# Patient Record
Sex: MALE | Race: BLACK OR AFRICAN AMERICAN | NOT HISPANIC OR LATINO | URBAN - METROPOLITAN AREA
[De-identification: names, ages, dates, MRNs, and addresses within clinical notes are randomized per-mention and may not be internally consistent; named-entity substitution may affect disease eponyms.]

---

## 2022-08-09 ENCOUNTER — EMERGENCY (EMERGENCY)
Facility: HOSPITAL | Age: 62
LOS: 0 days | Discharge: HOME | End: 2022-08-09
Attending: STUDENT IN AN ORGANIZED HEALTH CARE EDUCATION/TRAINING PROGRAM

## 2022-08-09 VITALS
SYSTOLIC BLOOD PRESSURE: 182 MMHG | WEIGHT: 175.05 LBS | DIASTOLIC BLOOD PRESSURE: 111 MMHG | HEART RATE: 104 BPM | RESPIRATION RATE: 19 BRPM | TEMPERATURE: 98 F | HEIGHT: 74 IN | OXYGEN SATURATION: 97 %

## 2022-08-09 DIAGNOSIS — R33.9 RETENTION OF URINE, UNSPECIFIED: ICD-10-CM

## 2022-08-09 DIAGNOSIS — N40.0 BENIGN PROSTATIC HYPERPLASIA WITHOUT LOWER URINARY TRACT SYMPTOMS: ICD-10-CM

## 2022-08-09 LAB
APPEARANCE UR: ABNORMAL
BACTERIA # UR AUTO: ABNORMAL
BILIRUB UR-MCNC: NEGATIVE — SIGNIFICANT CHANGE UP
COLOR SPEC: ABNORMAL
DIFF PNL FLD: ABNORMAL
GLUCOSE UR QL: NEGATIVE MG/DL — SIGNIFICANT CHANGE UP
KETONES UR-MCNC: ABNORMAL
LEUKOCYTE ESTERASE UR-ACNC: ABNORMAL
NITRITE UR-MCNC: NEGATIVE — SIGNIFICANT CHANGE UP
PH UR: 7 — SIGNIFICANT CHANGE UP (ref 5–8)
PROT UR-MCNC: 500 MG/DL
RBC CASTS # UR COMP ASSIST: SIGNIFICANT CHANGE UP /HPF (ref 0–4)
SP GR SPEC: 1.01 — SIGNIFICANT CHANGE UP (ref 1.01–1.02)
UROBILINOGEN FLD QL: NEGATIVE MG/DL — SIGNIFICANT CHANGE UP
WBC UR QL: ABNORMAL

## 2022-08-09 PROCEDURE — 99284 EMERGENCY DEPT VISIT MOD MDM: CPT

## 2022-08-09 RX ORDER — CEFDINIR 250 MG/5ML
1 POWDER, FOR SUSPENSION ORAL
Qty: 20 | Refills: 0
Start: 2022-08-09 | End: 2022-08-18

## 2022-08-09 NOTE — ED PROVIDER NOTE - CLINICAL SUMMARY MEDICAL DECISION MAKING FREE TEXT BOX
Pt was clinically in urinary retention likely 2/2 BPH. Almodovar immediately palced with 300cc bloody urine, no clots, flowing well and pt now comfortalbe. will check UA to r/o infection and have pt followup w/ urology tomorrow at [facility]. will keep almodovar in place

## 2022-08-09 NOTE — ED ADULT TRIAGE NOTE - CHIEF COMPLAINT QUOTE
patient here for Urinary retention , last night urinating was last night , c/o of abdominal pain , patient had a cath remove yesterday

## 2022-08-09 NOTE — ED ADULT NURSE NOTE - NSIMPLEMENTINTERV_GEN_ALL_ED
Implemented All Universal Safety Interventions:  [place] to call system. Call bell, personal items and telephone within reach. Instruct patient to call for assistance. Room bathroom lighting operational. Non-slip footwear when patient is off stretcher. Physically safe environment: no spills, clutter or unnecessary equipment. Stretcher in lowest position, wheels locked, appropriate side rails in place.

## 2022-08-09 NOTE — ED PROVIDER NOTE - NS ED ROS FT
CONST: no fevers, no chills  EYES: no pain, no vision changes  ENT: no sore throat, no ear pain, no change in hearing  CV: no chest pain, no leg swelling  RESP: no shortness of breath, no cough  ABD: no abdominal pain, no nausea, no vomiting, no diarrhea  : no dysuria, no flank pain  MSK: no back pain, no extremity pain  NEURO: no headache or additional neurologic complaints  HEME: no easy bleeding  SKIN:  no rash

## 2022-08-09 NOTE — ED PROVIDER NOTE - PHYSICAL EXAMINATION
General: apppears uncomfortable  HEENT: NCAT, Neck supple without meningismus, PERRL, no conjunctival injection  Lungs: CTAB, No wheeze or crackles, No retractions, No increased work of breathing  Heart: S1S2 RRR, No M/R/G, Pules equal Bilaterally in upper and lower extremities distally  Abd: soft, suprapubic distention and tenderness, No guarding, No rebound.  No hernias, no palpable masses.  Extrem: FROM in all joints, no gross deformities appreciated, no significant edema noted, No ulcers. Cap refil < 2sec.  Skin: No rash noted, warm dry.  Neuro:  Grossly normal.  No difficulty ambulating. No focal deficits.  Psychiatric: Appropriate mood and affect.

## 2022-08-09 NOTE — ED PROVIDER NOTE - IV ALTEPLASE EXCL ABS HIDDEN
NEPHROLOGY PROGRESS NOTE    ADMISSION DATE:  6/30/2021  DATE:  7/4/2021  CURRENT HOSPITAL DAY:  Hospital Day: 5  ATTENDING PHYSICIAN:  Trace Lane MD      CHIEF COMPLAINT:  beth      INTERVAL HISTORY:    Patient underwent UF session yesterday with 3 L fluid removal.  Her breathing is better.  She denies any worsening shortness of breath or chest pain.  She is currently on a Protonix drip and hemoglobin is stable    REVIEW OF SYSTEMS:   A complete 3 point ROS done and is neg except as mentioned above        MEDICATIONS:    • furosemide  80 mg Intravenous BID   • iron sucrose (VENOFER) IVPB  200 mg Intravenous Daily   • sucralfate  1 g Oral 4x Daily AC & HS   • allopurinol  50 mg Oral Daily   • atorvastatin  40 mg Oral Nightly   • brimonidine  1 drop Both Eyes BID   • calcium carbonate-vitamin D  1 tablet Oral Daily with breakfast   • dorzolamide  1 drop Right Eye BID   • insulin glargine  8 Units Subcutaneous Nightly   • isosorbide mononitrate  30 mg Oral Daily   • metoPROLOL succinate  25 mg Oral Daily   • NIFEdipine XL  120 mg Oral Daily   • doxazosin  8 mg Oral Nightly   • timolol  1 drop Both Eyes BID   • nystatin   Topical 2 times per day   • sodium chloride (PF)  2 mL Intracatheter 2 times per day   • insulin lispro   Subcutaneous TID AC   • amoxicillin-clavulanate  1 tablet Oral Q24H         OBJECTIVE:    VITAL SIGNS:     Visit Vitals  /79 (BP Location: LUE - Left upper extremity, Patient Position: Semi-Damon's)   Pulse 67   Temp 98.1 °F (36.7 °C) (Oral)   Resp 20   Ht 5' 2\" (1.575 m)   Wt 71.3 kg   SpO2 98%   BMI 28.75 kg/m²         INTAKE/OUTPUT:      Intake/Output Summary (Last 24 hours) at 7/4/2021 1004  Last data filed at 7/4/2021 0820  Gross per 24 hour   Intake 905.35 ml   Output 3000 ml   Net -2094.65 ml         PHYSICAL EXAM:    GEN: No apparent distress, sitting in chair  HEENT: No conjunctival erythema, neck supple, normocephalic head, mucous membranes moist  SKIN: Warm to touch  EXT: 1+  pedal edema, RUE edema +  PSYCH: Cooperative  NEURO: Alert and oriented x 1-2      LABORATORY DATA:    Lab Results   Component Value Date    SODIUM 140 07/03/2021    SODIUM 137 07/02/2021    POTASSIUM 3.9 07/03/2021    POTASSIUM 5.1 07/02/2021    CHLORIDE 103 07/03/2021    CHLORIDE 102 07/02/2021    CO2 31 07/03/2021    CO2 28 07/02/2021    BUN 34 (H) 07/03/2021    BUN 61 (H) 07/02/2021    CREATININE 3.08 (H) 07/03/2021    CREATININE 4.19 (H) 07/02/2021    GLUCOSE 75 07/03/2021    GLUCOSE 122 (H) 07/02/2021     Lab Results   Component Value Date    WBC 11.9 (H) 07/03/2021    WBC 11.2 (H) 07/02/2021    HCT 29.1 (L) 07/03/2021    HCT 26.3 (L) 07/02/2021    HGB 9.1 (L) 07/03/2021    HGB 8.3 (L) 07/02/2021    PLT 88 (L) 07/03/2021     07/02/2021     No components found for: CRCLCALC  Lab Results   Component Value Date    INR 1.1 06/30/2021    INR 1.1 06/21/2021                                    ASSESSMENT/PLAN:     ESRD on MWF dialysis at [facility]  Acute upper GI bleed due to duodenal ulcer  Peptic ulcer disease-due to duodenal ulcer  Anemia of chronic disease and blood loss  Fluid overload  Acute on chronic right heart failure exacerbation  Severe pulmonary hypertension  Hypertension  Severe protein calorie malnutrition    Plan  -Plan for dialysis tomorrow as per her MWF schedule  -Her edema slowly improving.  Monitor  -Hemoglobin stable.  On Protonix drip  -BP stable.  Continue with current management  -Continue with Lasix 80 mg IV b.i.d.  -Potassium better.  Should correct further with dialysis tomorrow  -Overall patient doing better      Discussed with RN in detail    Thank you for providing me with the opportunity to care for this patient. Will continue to follow closely. Please feel free to page me with any questions at [phone].                    show

## 2022-08-09 NOTE — ED ADULT NURSE NOTE - OBJECTIVE STATEMENT
as per patient c/o urinary retention since last night, had almodovar removed yesterday. pt is axo4, respirations spontaneous and unlabored.   Hx: BPH

## 2022-08-09 NOTE — ED PROVIDER NOTE - PATIENT PORTAL LINK FT
You can access the FollowMyHealth Patient Portal offered by [facility] by registering at the following website: [email or web address]. By joining [employer]’s FollowMyHealth portal, you will also be able to view your health information using other applications (apps) compatible with our system.

## 2022-08-09 NOTE — ED ADULT TRIAGE NOTE - PATIENT ON (OXYGEN DELIVERY METHOD)
Start taking Eliquis twice daily   Start taking Metoprolol once daily   STOP taking your baby aspirin    room air

## 2022-08-09 NOTE — ED PROVIDER NOTE - OBJECTIVE STATEMENT
pmh of BPH p/w urinary retention x 6 hrs. Pt states he had almodovar removed by urolgoist at [facility] yesterday. States he passed TOV but since last night has been unable to void. has had mild dribbling. denies flank pain fever chills. States sxs consitent w prior urinary retention.

## 2022-08-09 NOTE — ED PROVIDER NOTE - NSFOLLOWUPINSTRUCTIONS_ED_ALL_ED_FT
Urinary Tract Infection    A urinary tract infection (UTI) is an infection of any part of the urinary tract, which includes the kidneys, ureters, bladder, and urethra. Risk factors include ignoring your need to urinate, wiping back to front if female, being an uncircumcised male, and having diabetes or a weak immune system. Symptoms include frequent urination, pain or burning with urination, foul smelling urine, cloudy urine, pain in the lower abdomen, blood in the urine, and fever. If you were prescribed an antibiotic medicine, take it as told by your health care provider. Do not stop taking the antibiotic even if you start to feel better.    SEEK IMMEDIATE MEDICAL CARE IF YOU HAVE ANY OF THE FOLLOWING SYMPTOMS: severe back or abdominal pain, fever, inability to keep fluids or medicine down, dizziness/lightheadedness, or a change in mental status.     López Catheter Placement and Care    WHAT YOU NEED TO KNOW:    A López catheter is a sterile tube that is inserted into your bladder to drain urine. It is also called an indwelling urinary catheter. The tip of the catheter has a small balloon filled with solution that holds the catheter inside your bladder.              DISCHARGE INSTRUCTIONS:    Seek care immediately if:     Your catheter comes out.       You suddenly have material that looks like sand in the tubing or drainage bag.      No urine is draining into the bag and you have checked the system.      You have pain in your hip, back, pelvis, or lower abdomen.      You are confused or cannot think clearly.    Call your doctor or urologist if:     You have a fever.      You have bladder spasms for more than 1 day after the catheter is placed.      You see blood in the tubing or drainage bag.      You have a rash or itching where the catheter tube is secured to your skin.      Urine leaks from or around the catheter, tubing, or drainage bag.      The closed drainage system has accidently come open or apart.       You see a layer of crystals inside the tubing.      You have questions or concerns about your condition or care.    Care for your López catheter:     Clean your genital area 2 times every day. Clean your catheter and the area around where it was inserted. Use soap and water. Clean your anal opening and catheter area after every bowel movement.       Secure the catheter tube so you do not pull or move the catheter. This helps prevent pain and bladder spasms. Healthcare providers will show you how to use medical tape or a strap to secure the catheter tube to your body.       Keep a closed drainage system. Your López catheter should always be attached to the drainage bag to form a closed system. Do not disconnect any part of the closed system unless you need to change the bag.    Care for your drainage bag:     Ask if a leg bag is right for you. A leg bag can be worn under your clothes. Ask your healthcare provider for more information about a leg bag.       Keep the drainage bag below the level of your waist. This helps stop urine from moving back up the tubing and into your bladder. Do not loop or kink the tubing. This can cause urine to back up and collect in your bladder. Do not let the drainage bag touch or lie on the floor.      Empty the drainage bag when needed. The weight of a full drainage bag can be painful. Empty the drainage bag every 3 to 6 hours or when it is ? full.       Clean and change the drainage bag as directed. Ask your healthcare provider how often you should change the drainage bag and what cleaning solution to use. Wear disposable gloves when you change the bag. Do not allow the end of the catheter or tubing to touch anything. Clean the ends with an alcohol pad before you reconnect them.    What to do if problems develop:     No urine is draining into the bag:   Check for kinks in the tubing and straighten them out.       Check the tape or strap used to secure the catheter tube to your skin. Make sure it is not blocking the tube.       Make sure you are not sitting or lying on the tubing.      Make sure the urine bag is hanging below the level of your waist.      Urine leaks from or around the catheter, tubing, or drainage bag: Check if the closed drainage system has accidently come open or apart. Clean the catheter and tubing ends with a new alcohol pad and reconnect them.     Prevent an infection:     Wash your hands often. Wash before and after you touch your catheter, tubing, or drainage bag. Use soap and water. Wear clean disposable gloves when you care for your catheter or disconnect the drainage bag. Wash your hands before you prepare or eat food. Handwashing           Drink liquids as directed. Ask your healthcare provider how much liquid to drink each day and which liquids are best for you. Liquids will help flush your kidneys and bladder to help prevent infection.    Follow up with your doctor or urologist as directed: Write down your questions so you remember to ask them during your visits.

## 2022-08-10 ENCOUNTER — EMERGENCY (EMERGENCY)
Facility: HOSPITAL | Age: 62
LOS: 0 days | Discharge: HOME | End: 2022-08-10
Attending: STUDENT IN AN ORGANIZED HEALTH CARE EDUCATION/TRAINING PROGRAM

## 2022-08-10 VITALS
SYSTOLIC BLOOD PRESSURE: 128 MMHG | RESPIRATION RATE: 18 BRPM | OXYGEN SATURATION: 98 % | TEMPERATURE: 98 F | DIASTOLIC BLOOD PRESSURE: 78 MMHG | HEART RATE: 78 BPM

## 2022-08-10 VITALS
HEART RATE: 75 BPM | HEIGHT: 74 IN | DIASTOLIC BLOOD PRESSURE: 83 MMHG | TEMPERATURE: 98 F | OXYGEN SATURATION: 96 % | RESPIRATION RATE: 16 BRPM | SYSTOLIC BLOOD PRESSURE: 134 MMHG | WEIGHT: 175.05 LBS
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
## 2022-08-10 DIAGNOSIS — R33.9 RETENTION OF URINE, UNSPECIFIED: ICD-10-CM
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
## 2022-08-10 DIAGNOSIS — T83.031A LEAKAGE OF INDWELLING URETHRAL CATHETER, INITIAL ENCOUNTER: ICD-10-CM

## 2022-08-10 DIAGNOSIS — N40.0 BENIGN PROSTATIC HYPERPLASIA WITHOUT LOWER URINARY TRACT SYMPTOMS: ICD-10-CM

## 2022-08-10 PROCEDURE — 99282 EMERGENCY DEPT VISIT SF MDM: CPT

## 2022-08-10 NOTE — ED PROVIDER NOTE - CLINICAL SUMMARY MEDICAL DECISION MAKING FREE TEXT BOX
well appearing, almodovar catheter flushing and filling leg bag without issue in ED. has urology follow up. will dc

## 2022-08-10 NOTE — ED ADULT NURSE NOTE - OBJECTIVE STATEMENT
Received pt in the ED, AOx3, from triage. C/o López cathter complications. Pt endorsed the López was clotted and it was leaking, but it resolved in the ED and the urine is draining to the bag. Urine noted red color, pt endorsed it is same as the López was placed yesterday in this ED. Pt denied cp, sob, n/v/d, no abd pain. Speaks full sentences, unlabored breathing on RA. Able to LÓPEZ.

## 2022-08-10 NOTE — ED PROVIDER NOTE - OBJECTIVE STATEMENT
62m hx bph. seen in ED yesterday for urinary retention. d/w almodovar and empiric antibiotic coverage returning today for urine leaking around the almodovar cather and out his penis. this was happening when the almodovar bag was full. no that the almodovar bag is empty he is passing urine into the bag without issuer

## 2022-08-10 NOTE — ED ADULT TRIAGE NOTE - CHIEF COMPLAINT QUOTE
hx of BPH PT reports leaking urinary catheter. last noted urine 2200. López placed yesterday in this ED.

## 2022-08-10 NOTE — ED PROVIDER NOTE - PHYSICAL EXAMINATION
General: Awake, alert and oriented. No acute distress. Well developed, hydrated and nourished. Appears stated age.   Skin: Skin in warm, dry and intact without rashes or lesions. Appropriate color for ethnicity  HENMT: head normocephalic and atraumatic; bilateral external ears without swelling. no nasal discharge. moist oral mucosa. supple neck, trachea midline  EYES: Conjunctiva clear. nonicteric sclera. EOM intact, Eyelids are normal in appearance without swelling or lesions.  Cardiac: well perfused  Respiratory: breathing comfortably on room air. no audible wheezing or stridor  Abdominal: nondistended, soft, nontender  : almodovar cather in place draining blood tinged urine  MSK: Neck and back are without deformity, visible external skin changes, or signs of trauma. Curvature of the cervical, thoracic, and lumbar spine are within normal limits. no external signs of trauma. no apparent deficits in ROM of any extremity  Neurological: The patient is awake, alert and oriented to person, place, and time with normal speech. CN 2-12 grossly intact. no apparent deficits. Memory is normal and thought process is intact. No gait abnormalities are appreciated.   Psychiatric: Appropriate mood and affect. Good judgement and insight. No visual or auditory hallucinations.

## 2022-08-10 NOTE — ED ADULT NURSE NOTE - NSIMPLEMENTINTERV_GEN_ALL_ED
Implemented All Universal Safety Interventions:  [place] to call system. Call bell, personal items and telephone within reach. Instruct patient to call for assistance. Room bathroom lighting operational. Non-slip footwear when patient is off stretcher. Physically safe environment: no spills, clutter or unnecessary equipment. Stretcher in lowest position, wheels locked, appropriate side rails in place.

## 2022-08-10 NOTE — ED PROVIDER NOTE - PATIENT PORTAL LINK FT
You can access the FollowMyHealth Patient Portal offered by [facility] by registering at the following website: [email or web address]. By joining [employer]’s FollowMyHealth portal, you will also be able to view your health information using other applications (apps) compatible with our system.

## 2022-08-11 LAB
-  AMIKACIN: SIGNIFICANT CHANGE UP
-  AMOXICILLIN/CLAVULANIC ACID: SIGNIFICANT CHANGE UP
-  AMPICILLIN/SULBACTAM: SIGNIFICANT CHANGE UP
-  AMPICILLIN: SIGNIFICANT CHANGE UP
-  AZTREONAM: SIGNIFICANT CHANGE UP
-  CEFAZOLIN: SIGNIFICANT CHANGE UP
-  CEFEPIME: SIGNIFICANT CHANGE UP
-  CEFTRIAXONE: SIGNIFICANT CHANGE UP
-  CIPROFLOXACIN: SIGNIFICANT CHANGE UP
-  ERTAPENEM: SIGNIFICANT CHANGE UP
-  GENTAMICIN: SIGNIFICANT CHANGE UP
-  IMIPENEM: SIGNIFICANT CHANGE UP
-  LEVOFLOXACIN: SIGNIFICANT CHANGE UP
-  MEROPENEM: SIGNIFICANT CHANGE UP
-  NITROFURANTOIN: SIGNIFICANT CHANGE UP
-  PIPERACILLIN/TAZOBACTAM: SIGNIFICANT CHANGE UP
-  TIGECYCLINE: SIGNIFICANT CHANGE UP
-  TOBRAMYCIN: SIGNIFICANT CHANGE UP
-  TRIMETHOPRIM/SULFAMETHOXAZOLE: SIGNIFICANT CHANGE UP
CULTURE RESULTS: SIGNIFICANT CHANGE UP
METHOD TYPE: SIGNIFICANT CHANGE UP
ORGANISM # SPEC MICROSCOPIC CNT: SIGNIFICANT CHANGE UP
ORGANISM # SPEC MICROSCOPIC CNT: SIGNIFICANT CHANGE UP
SPECIMEN SOURCE: SIGNIFICANT CHANGE UP

## 2022-08-14 ENCOUNTER — INPATIENT (INPATIENT)
Facility: HOSPITAL | Age: 62
LOS: 9 days | Discharge: HOME HEALTH | End: 2022-08-24
Attending: INTERNAL MEDICINE | Admitting: INTERNAL MEDICINE

## 2022-08-14 VITALS
DIASTOLIC BLOOD PRESSURE: 73 MMHG | WEIGHT: 175.05 LBS | TEMPERATURE: 98 F | OXYGEN SATURATION: 97 % | HEIGHT: 74 IN | SYSTOLIC BLOOD PRESSURE: 125 MMHG | HEART RATE: 69 BPM | RESPIRATION RATE: 15 BRPM

## 2022-08-14 DIAGNOSIS — N39.0 URINARY TRACT INFECTION, SITE NOT SPECIFIED: ICD-10-CM

## 2022-08-14 DIAGNOSIS — Z87.898 PERSONAL HISTORY OF OTHER SPECIFIED CONDITIONS: ICD-10-CM

## 2022-08-14 LAB
ALBUMIN SERPL ELPH-MCNC: 3.7 G/DL — SIGNIFICANT CHANGE UP (ref 3.3–5)
ALP SERPL-CCNC: 73 U/L — SIGNIFICANT CHANGE UP (ref 40–120)
ALT FLD-CCNC: 40 U/L — SIGNIFICANT CHANGE UP (ref 12–78)
ANION GAP SERPL CALC-SCNC: 4 MMOL/L — LOW (ref 5–17)
APPEARANCE UR: ABNORMAL
APTT BLD: 31.5 SEC — SIGNIFICANT CHANGE UP (ref 27.5–35.5)
AST SERPL-CCNC: 24 U/L — SIGNIFICANT CHANGE UP (ref 15–37)
BACTERIA # UR AUTO: ABNORMAL
BASOPHILS # BLD AUTO: 0.06 K/UL — SIGNIFICANT CHANGE UP (ref 0–0.2)
BASOPHILS NFR BLD AUTO: 1 % — SIGNIFICANT CHANGE UP (ref 0–2)
BILIRUB SERPL-MCNC: 0.3 MG/DL — SIGNIFICANT CHANGE UP (ref 0.2–1.2)
BILIRUB UR-MCNC: NEGATIVE — SIGNIFICANT CHANGE UP
BUN SERPL-MCNC: 14 MG/DL — SIGNIFICANT CHANGE UP (ref 7–23)
CALCIUM SERPL-MCNC: 9.2 MG/DL — SIGNIFICANT CHANGE UP (ref 8.5–10.1)
CHLORIDE SERPL-SCNC: 107 MMOL/L — SIGNIFICANT CHANGE UP (ref 96–108)
CO2 SERPL-SCNC: 30 MMOL/L — SIGNIFICANT CHANGE UP (ref 22–31)
COLOR SPEC: ABNORMAL
CREAT SERPL-MCNC: 1.07 MG/DL — SIGNIFICANT CHANGE UP (ref 0.5–1.3)
DIFF PNL FLD: ABNORMAL
EGFR: 78 ML/MIN/1.73M2 — SIGNIFICANT CHANGE UP
EOSINOPHIL # BLD AUTO: 0.27 K/UL — SIGNIFICANT CHANGE UP (ref 0–0.5)
EOSINOPHIL NFR BLD AUTO: 4.4 % — SIGNIFICANT CHANGE UP (ref 0–6)
FLUAV AG NPH QL: SIGNIFICANT CHANGE UP
FLUBV AG NPH QL: SIGNIFICANT CHANGE UP
GLUCOSE SERPL-MCNC: 94 MG/DL — SIGNIFICANT CHANGE UP (ref 70–99)
GLUCOSE UR QL: NEGATIVE MG/DL — SIGNIFICANT CHANGE UP
HCT VFR BLD CALC: 40.5 % — SIGNIFICANT CHANGE UP (ref 39–50)
HGB BLD-MCNC: 12.6 G/DL — LOW (ref 13–17)
IMM GRANULOCYTES NFR BLD AUTO: 0.3 % — SIGNIFICANT CHANGE UP (ref 0–1.5)
INR BLD: 0.98 RATIO — SIGNIFICANT CHANGE UP (ref 0.88–1.16)
KETONES UR-MCNC: NEGATIVE — SIGNIFICANT CHANGE UP
LACTATE SERPL-SCNC: 1.2 MMOL/L — SIGNIFICANT CHANGE UP (ref 0.7–2)
LEUKOCYTE ESTERASE UR-ACNC: ABNORMAL
LYMPHOCYTES # BLD AUTO: 1.81 K/UL — SIGNIFICANT CHANGE UP (ref 1–3.3)
LYMPHOCYTES # BLD AUTO: 29.4 % — SIGNIFICANT CHANGE UP (ref 13–44)
MCHC RBC-ENTMCNC: 26.3 PG — LOW (ref 27–34)
MCHC RBC-ENTMCNC: 31.1 G/DL — LOW (ref 32–36)
MCV RBC AUTO: 84.6 FL — SIGNIFICANT CHANGE UP (ref 80–100)
MONOCYTES # BLD AUTO: 0.54 K/UL — SIGNIFICANT CHANGE UP (ref 0–0.9)
MONOCYTES NFR BLD AUTO: 8.8 % — SIGNIFICANT CHANGE UP (ref 2–14)
NEUTROPHILS # BLD AUTO: 3.46 K/UL — SIGNIFICANT CHANGE UP (ref 1.8–7.4)
NEUTROPHILS NFR BLD AUTO: 56.1 % — SIGNIFICANT CHANGE UP (ref 43–77)
NITRITE UR-MCNC: POSITIVE
NRBC # BLD: 0 /100 WBCS — SIGNIFICANT CHANGE UP (ref 0–0)
PH UR: 7 — SIGNIFICANT CHANGE UP (ref 5–8)
PLATELET # BLD AUTO: 295 K/UL — SIGNIFICANT CHANGE UP (ref 150–400)
POTASSIUM SERPL-MCNC: 4.2 MMOL/L — SIGNIFICANT CHANGE UP (ref 3.5–5.3)
POTASSIUM SERPL-SCNC: 4.2 MMOL/L — SIGNIFICANT CHANGE UP (ref 3.5–5.3)
PROT SERPL-MCNC: 7.1 GM/DL — SIGNIFICANT CHANGE UP (ref 6–8.3)
PROT UR-MCNC: 500 MG/DL
PROTHROM AB SERPL-ACNC: 11.7 SEC — SIGNIFICANT CHANGE UP (ref 10.5–13.4)
RBC # BLD: 4.79 M/UL — SIGNIFICANT CHANGE UP (ref 4.2–5.8)
RBC # FLD: 14.8 % — HIGH (ref 10.3–14.5)
RBC CASTS # UR COMP ASSIST: >50 /HPF (ref 0–4)
SARS-COV-2 RNA SPEC QL NAA+PROBE: SIGNIFICANT CHANGE UP
SODIUM SERPL-SCNC: 141 MMOL/L — SIGNIFICANT CHANGE UP (ref 135–145)
SP GR SPEC: 1.01 — SIGNIFICANT CHANGE UP (ref 1.01–1.02)
UROBILINOGEN FLD QL: NEGATIVE MG/DL — SIGNIFICANT CHANGE UP
WBC # BLD: 6.16 K/UL — SIGNIFICANT CHANGE UP (ref 3.8–10.5)
WBC # FLD AUTO: 6.16 K/UL — SIGNIFICANT CHANGE UP (ref 3.8–10.5)
WBC UR QL: SIGNIFICANT CHANGE UP

## 2022-08-14 PROCEDURE — 99285 EMERGENCY DEPT VISIT HI MDM: CPT

## 2022-08-14 RX ORDER — TAMSULOSIN HYDROCHLORIDE 0.4 MG/1
0.4 CAPSULE ORAL AT BEDTIME
Refills: 0 | Status: DISCONTINUED | OUTPATIENT
Start: 2022-08-14 | End: 2022-08-24

## 2022-08-14 RX ORDER — SODIUM CHLORIDE 9 MG/ML
1000 INJECTION INTRAMUSCULAR; INTRAVENOUS; SUBCUTANEOUS
Refills: 0 | Status: DISCONTINUED | OUTPATIENT
Start: 2022-08-14 | End: 2022-08-24

## 2022-08-14 RX ORDER — LIDOCAINE HCL 20 MG/ML
10 VIAL (ML) INJECTION ONCE
Refills: 0 | Status: COMPLETED | OUTPATIENT
Start: 2022-08-14 | End: 2022-08-14

## 2022-08-14 RX ORDER — PIPERACILLIN AND TAZOBACTAM 4; .5 G/20ML; G/20ML
3.38 INJECTION, POWDER, LYOPHILIZED, FOR SOLUTION INTRAVENOUS ONCE
Refills: 0 | Status: COMPLETED | OUTPATIENT
Start: 2022-08-14 | End: 2022-08-14

## 2022-08-14 RX ADMIN — PIPERACILLIN AND TAZOBACTAM 200 GRAM(S): 4; .5 INJECTION, POWDER, LYOPHILIZED, FOR SOLUTION INTRAVENOUS at 20:08

## 2022-08-14 RX ADMIN — SODIUM CHLORIDE 75 MILLILITER(S): 9 INJECTION INTRAMUSCULAR; INTRAVENOUS; SUBCUTANEOUS at 20:08

## 2022-08-14 RX ADMIN — TAMSULOSIN HYDROCHLORIDE 0.4 MILLIGRAM(S): 0.4 CAPSULE ORAL at 21:26

## 2022-08-14 NOTE — H&P ADULT - NSHPPHYSICALEXAM_GEN_ALL_CORE
PHYSICAL EXAM:    GENERAL: NAD, well-groomed, well-developed  HEAD:  Atraumatic, Normocephalic  EYES: EOMI, PERRLA, conjunctiva and sclera clear  ENMT: No tonsillar erythema, exudates, or enlargement; Moist mucous membranes, , No lesions  NECK: Supple, No JVD, Normal thyroid  NERVOUS SYSTEM:  Alert & Oriented X4, ; Motor Strength 5/5 B/L upper and lower extremities; DTRs 2+ intact and symmetric  CHEST/LUNG: Clear  bilaterally; No rales, rhonchi, wheezing, or rubs  HEART: Regular rate and rhythm; No murmurs, rubs, or gallops  ABDOMEN: Soft, Nontender, Nondistended; no  masses Bowel sounds present  EXTREMITIES:  + Peripheral Pulses, No clubbing, cyanosis, or edema  LYMPH: No lymphadenopathy noted   RECTAL: deferred    SKIN: No rashes or lesions   almodovar  with  gross  hematuria

## 2022-08-14 NOTE — ED PROVIDER NOTE - PHYSICAL EXAMINATION
GEN: Awake, alert, interactive, NAD.  HEAD AND NECK: NC/AT. Airway patent. Neck supple.   EYES:  Clear b/l. EOMI. PERRL.   ENT: Moist mucus membranes.   CARDIAC: Regular rate, regular rhythm. No evident pedal edema.    RESP/CHEST: Normal respiratory effort with no use of accessory muscles or retractions. Clear throughout on auscultation.  ABD: soft, non-distended, non-tender. No rebound, no guarding.   BACK: No midline spinal TTP. No CVAT.   EXTREMITIES: Moving all extremities with no apparent deformities.   SKIN: Warm, dry, intact normal color. No rash.   NEURO: AOx3, CN II-XII grossly intact, no focal deficits.   PSYCH: Appropriate mood and affect.   : gross hematuria within López bag.

## 2022-08-14 NOTE — ED ADULT NURSE NOTE - CAS DISCH ACCOMP BY
How Severe Are Your Spot(S)?: mild Have Your Spot(S) Been Treated In The Past?: has not been treated Hpi Title: Evaluation of Skin Lesions Transport

## 2022-08-14 NOTE — ED PROVIDER NOTE - OBJECTIVE STATEMENT
63 y/o M wPMH of BPH presents to the ED for hematuria and UTI. Pt was called back to the ED b/c of (+) UA/C which showed resistance to antibiotics. Pt noted López has been in place since August 2nd.  Pt noted he has hx of UTI's in the past. Pt also noted blood in his López. Pt has no other complaints in the ED. 62M w/ PMH BPH, indwelling López since 8/2 called back to ED d/t (+) UC which showed resistance to prescribed PO abx. Pt reports López placed 8/2, d/c'd on 8/8 w/in Uro office, pt w/ urinary retention s/p removal,   63 y/o M wPMH of BPH presents to the ED for hematuria and UTI. Pt was called back to the ED b/c of (+) UA/C which showed resistance to antibiotics. Pt noted López has been in place since August 2nd.  Pt noted he has hx of UTI's in the past. Pt also noted blood in his López. Pt has no other complaints in the ED. 62M w/ PMH BPH, indwelling López since 8/2 called back to ED d/t (+) UC which showed resistance to prescribed PO abx. Pt reports López placed 8/2, d/c'd on 8/8 w/in Uro office, pt w/ urinary retention s/p removal, presented to ED on 8/9 w/ López placement. Pt returned to ED next day d/t leaking catheter. UC from 8/9-10 + ESBL Klebsiella, resistant to PO abx. Pt endorses + mild chill, intermittent gross hematuria and slight penile discomfort 2/2 López. Denies fever, h/a, dizziness, CP, SOB, cough, abd pain, flank pain, LE pain / swelling, rash. Pt w/ hx UTIs in past.     PMH BPH, PSH prostate, NKDA, meds Flomax.

## 2022-08-14 NOTE — H&P ADULT - ASSESSMENT
IMPROVE VTE Individual Risk Assessment    RISK                                                                Points    [  ] Previous VTE                                                  3    [  ] Thrombophilia                                               2    [  ] Lower limb paralysis                                      2        (unable to hold up >15 seconds)      [  ] Current Cancer                                              2         (within 6 months)    [ x ] Immobilization > 24 hrs                                1    [  ] ICU/CCU stay > 24 hours                              1    [ x ] Age > 60                                                      1    IMPROVE VTE Score ____2_____    IMPROVE Score 0-1: Low Risk, No VTE prophylaxis required for most patients, encourage ambulation.   IMPROVE Score 2-3: At risk, pharmacologic VTE prophylaxis is indicated for most patients (in the absence of a contraindication)  IMPROVE Score > or = 4: High Risk, pharmacologic VTE prophylaxis is indicated for most patients (in the absence of a contraindication)

## 2022-08-14 NOTE — ED PROVIDER NOTE - CLINICAL SUMMARY MEDICAL DECISION MAKING FREE TEXT BOX
63 yo m wP of BPH indwelling López and cath since Aug x2, called back to ED for (+) urine culture resistant to PO antibiotics, AFVSS, NAD, plan- repeat UA/C, blood cultures, lactate, CBC, CMP, discuss with urology and re-eval. 62M w/ PMH BPH, indwelling López since 8/2 called back to ED d/t (+) UC resistant to PO abx. AF, VSS. Well appearing, in NAD. Plan: CBC, CMP, lactate, BCs, replace López, UA/C. Consult Uro. 62M w/ PMH BPH, indwelling López since 8/2 called back to ED d/t (+) UC resistant to PO abx. AF, VSS. Well appearing, in NAD. Plan: CBC, CMP, lactate, BCs, replace López, UA/C. Consult Uro. Re-eval.

## 2022-08-14 NOTE — ED ADULT TRIAGE NOTE - CHIEF COMPLAINT QUOTE
Patient called to return to ed for abnormal urine test result as his cefdinir 300mgs bid is not effective against bacteria causing his infection. Has a López catheter in place since Tuesday.

## 2022-08-14 NOTE — H&P ADULT - NSHPLABSRESULTS_GEN_ALL_CORE
LABS:                        12.6   6.16  )-----------( 295      ( 14 Aug 2022 15:52 )             40.5     08-14    141  |  107  |  14  ----------------------------<  94  4.2   |  30  |  1.07    Ca    9.2      14 Aug 2022 15:52    TPro  7.1  /  Alb  3.7  /  TBili  0.3  /  DBili  x   /  AST  24  /  ALT  40  /  AlkPhos  73  08-14    PT/INR - ( 14 Aug 2022 15:52 )   PT: 11.7 sec;   INR: 0.98 ratio         PTT - ( 14 Aug 2022 15:52 )  PTT:31.5 sec  Urinalysis Basic - ( 14 Aug 2022 16:55 )    Color: Red / Appearance: bloody / S.010 / pH: x  Gluc: x / Ketone: Negative  / Bili: Negative / Urobili: Negative mg/dL   Blood: x / Protein: 500 mg/dL / Nitrite: Positive   Leuk Esterase: Trace / RBC: x / WBC x   Sq Epi: x / Non Sq Epi: x / Bacteria: x

## 2022-08-14 NOTE — ED PROVIDER NOTE - PROGRESS NOTE DETAILS
WBC, lactate WNL. UA w/ + infection (nitrite positive). D/w Uro/Surg PA, will admit to medicine for IV abx. Pt updated to results, admission. He understands / agrees w/ this plan.

## 2022-08-14 NOTE — H&P ADULT - HISTORY OF PRESENT ILLNESS
62M w/ PMH BPH, indwelling López since 8/2 called back to ED d/t (+) UC which showed resistance to prescribed PO abx. Pt reports López placed 8/2, d/c'd on 8/8 w/in Uro office, pt w/ urinary retention s/p removal, presented to ED on 8/9 w/ López placement. Pt returned to ED next day d/t leaking catheter. UC from 8/9-10 + ESBL Klebsiella, resistant to PO abx. Pt endorses + mild chill, intermittent gross hematuria and slight penile discomfort 2/2 López. Denies fever, h/a, dizziness, CP, SOB, cough, abd pain, flank pain, LE pain / swelling, rash. Pt w/ hx UTIs in past.   no  abd  pain  no  fever  no  chills

## 2022-08-15 LAB
ALBUMIN SERPL ELPH-MCNC: 3.2 G/DL — LOW (ref 3.3–5)
ALP SERPL-CCNC: 62 U/L — SIGNIFICANT CHANGE UP (ref 40–120)
ALT FLD-CCNC: 33 U/L — SIGNIFICANT CHANGE UP (ref 12–78)
ANION GAP SERPL CALC-SCNC: 4 MMOL/L — LOW (ref 5–17)
AST SERPL-CCNC: 19 U/L — SIGNIFICANT CHANGE UP (ref 15–37)
BILIRUB SERPL-MCNC: 0.3 MG/DL — SIGNIFICANT CHANGE UP (ref 0.2–1.2)
BUN SERPL-MCNC: 10 MG/DL — SIGNIFICANT CHANGE UP (ref 7–23)
CALCIUM SERPL-MCNC: 8.6 MG/DL — SIGNIFICANT CHANGE UP (ref 8.5–10.1)
CHLORIDE SERPL-SCNC: 110 MMOL/L — HIGH (ref 96–108)
CO2 SERPL-SCNC: 28 MMOL/L — SIGNIFICANT CHANGE UP (ref 22–31)
CREAT SERPL-MCNC: 1.06 MG/DL — SIGNIFICANT CHANGE UP (ref 0.5–1.3)
EGFR: 79 ML/MIN/1.73M2 — SIGNIFICANT CHANGE UP
GLUCOSE SERPL-MCNC: 93 MG/DL — SIGNIFICANT CHANGE UP (ref 70–99)
HCT VFR BLD CALC: 40.1 % — SIGNIFICANT CHANGE UP (ref 39–50)
HCV AB S/CO SERPL IA: 0.09 S/CO — SIGNIFICANT CHANGE UP (ref 0–0.99)
HCV AB SERPL-IMP: SIGNIFICANT CHANGE UP
HGB BLD-MCNC: 12.2 G/DL — LOW (ref 13–17)
HIV 1+2 AB+HIV1 P24 AG SERPL QL IA: SIGNIFICANT CHANGE UP
MCHC RBC-ENTMCNC: 25.8 PG — LOW (ref 27–34)
MCHC RBC-ENTMCNC: 30.4 G/DL — LOW (ref 32–36)
MCV RBC AUTO: 84.8 FL — SIGNIFICANT CHANGE UP (ref 80–100)
NRBC # BLD: 0 /100 WBCS — SIGNIFICANT CHANGE UP (ref 0–0)
PLATELET # BLD AUTO: 285 K/UL — SIGNIFICANT CHANGE UP (ref 150–400)
POTASSIUM SERPL-MCNC: 4.3 MMOL/L — SIGNIFICANT CHANGE UP (ref 3.5–5.3)
POTASSIUM SERPL-SCNC: 4.3 MMOL/L — SIGNIFICANT CHANGE UP (ref 3.5–5.3)
PROT SERPL-MCNC: 6.3 GM/DL — SIGNIFICANT CHANGE UP (ref 6–8.3)
RBC # BLD: 4.73 M/UL — SIGNIFICANT CHANGE UP (ref 4.2–5.8)
RBC # FLD: 14.9 % — HIGH (ref 10.3–14.5)
SODIUM SERPL-SCNC: 142 MMOL/L — SIGNIFICANT CHANGE UP (ref 135–145)
WBC # BLD: 6.3 K/UL — SIGNIFICANT CHANGE UP (ref 3.8–10.5)
WBC # FLD AUTO: 6.3 K/UL — SIGNIFICANT CHANGE UP (ref 3.8–10.5)

## 2022-08-15 RX ORDER — PIPERACILLIN AND TAZOBACTAM 4; .5 G/20ML; G/20ML
3.38 INJECTION, POWDER, LYOPHILIZED, FOR SOLUTION INTRAVENOUS ONCE
Refills: 0 | Status: COMPLETED | OUTPATIENT
Start: 2022-08-15 | End: 2022-08-15

## 2022-08-15 RX ORDER — LACTOBACILLUS ACIDOPHILUS 100MM CELL
1 CAPSULE ORAL
Refills: 0 | Status: DISCONTINUED | OUTPATIENT
Start: 2022-08-15 | End: 2022-08-24

## 2022-08-15 RX ORDER — PIPERACILLIN AND TAZOBACTAM 4; .5 G/20ML; G/20ML
INJECTION, POWDER, LYOPHILIZED, FOR SOLUTION INTRAVENOUS
Refills: 0 | Status: DISCONTINUED | OUTPATIENT
Start: 2022-08-15 | End: 2022-08-15

## 2022-08-15 RX ORDER — FINASTERIDE 5 MG/1
5 TABLET, FILM COATED ORAL DAILY
Refills: 0 | Status: DISCONTINUED | OUTPATIENT
Start: 2022-08-15 | End: 2022-08-24

## 2022-08-15 RX ORDER — PIPERACILLIN AND TAZOBACTAM 4; .5 G/20ML; G/20ML
3.38 INJECTION, POWDER, LYOPHILIZED, FOR SOLUTION INTRAVENOUS EVERY 8 HOURS
Refills: 0 | Status: DISCONTINUED | OUTPATIENT
Start: 2022-08-15 | End: 2022-08-16

## 2022-08-15 RX ADMIN — PIPERACILLIN AND TAZOBACTAM 200 GRAM(S): 4; .5 INJECTION, POWDER, LYOPHILIZED, FOR SOLUTION INTRAVENOUS at 07:41

## 2022-08-15 RX ADMIN — PIPERACILLIN AND TAZOBACTAM 25 GRAM(S): 4; .5 INJECTION, POWDER, LYOPHILIZED, FOR SOLUTION INTRAVENOUS at 19:59

## 2022-08-15 RX ADMIN — PIPERACILLIN AND TAZOBACTAM 25 GRAM(S): 4; .5 INJECTION, POWDER, LYOPHILIZED, FOR SOLUTION INTRAVENOUS at 12:53

## 2022-08-15 RX ADMIN — TAMSULOSIN HYDROCHLORIDE 0.4 MILLIGRAM(S): 0.4 CAPSULE ORAL at 21:38

## 2022-08-15 RX ADMIN — SODIUM CHLORIDE 75 MILLILITER(S): 9 INJECTION INTRAMUSCULAR; INTRAVENOUS; SUBCUTANEOUS at 03:23

## 2022-08-15 NOTE — PROGRESS NOTE ADULT - SUBJECTIVE AND OBJECTIVE BOX
INTERVAL HPI/OVERNIGHT EVENTS:        REVIEW OF SYSTEMS:  CONSTITUTIONAL:  no  complaints    NECK: No pain or stiffnes  RESPIRATORY: No SOB   CARDIOVASCULAR: No chest pain, palpitations, dizziness,   GASTROINTESTINAL: No abdominal pain. No nausea, vomiting,   NEUROLOGICAL: No headaches, no  blurry  vision no  dizziness  SKIN: No itching,   MUSCULOSKELETAL: No pain    MEDICATION:  piperacillin/tazobactam IVPB.. 3.375 Gram(s) IV Intermittent every 8 hours  sodium chloride 0.9%. 1000 milliLiter(s) IV Continuous <Continuous>  tamsulosin 0.4 milliGRAM(s) Oral at bedtime    Vital Signs Last 24 Hrs  T(C): 36.6 (15 Aug 2022 05:18), Max: 36.8 (14 Aug 2022 20:50)  T(F): 97.8 (15 Aug 2022 05:18), Max: 98.2 (14 Aug 2022 20:50)  HR: 56 (15 Aug 2022 05:18) (50 - 90)  BP: 106/62 (15 Aug 2022 05:18) (106/62 - 144/80)  BP(mean): --  RR: 16 (15 Aug 2022 05:18) (15 - 18)  SpO2: 98% (15 Aug 2022 05:18) (96% - 99%)    Parameters below as of 15 Aug 2022 05:18  Patient On (Oxygen Delivery Method): room air        PHYSICAL EXAM:  GENERAL: NAD, well-groomed, well-developed  HEENT : Conjuntivae  clear sclerae anicteric  NECK: Supple, No JVD, Normal thyroid  NERVOUS SYSTEM:  Alert oriented   no  focal  deficits;   CHEST/LUNG: Clear    HEART: Regular rate and rhythm; No murmurs, rubs, or gallops  ABDOMEN: Soft, Nontender, Nondistended; Bowel sounds present  EXTREMITIES:  no  edema no  tenderness  SKIN: No rashes   LABS:                        12.2   6.30  )-----------( 285      ( 15 Aug 2022 07:07 )             40.1     08-14    141  |  107  |  14  ----------------------------<  94  4.2   |  30  |  1.07    Ca    9.2      14 Aug 2022 15:52    TPro  7.1  /  Alb  3.7  /  TBili  0.3  /  DBili  x   /  AST  24  /  ALT  40  /  AlkPhos  73  08-14    PT/INR - ( 14 Aug 2022 15:52 )   PT: 11.7 sec;   INR: 0.98 ratio         PTT - ( 14 Aug 2022 15:52 )  PTT:31.5 sec  Urinalysis Basic - ( 14 Aug 2022 16:55 )    Color: Red / Appearance: bloody / S.010 / pH: x  Gluc: x / Ketone: Negative  / Bili: Negative / Urobili: Negative mg/dL   Blood: x / Protein: 500 mg/dL / Nitrite: Positive   Leuk Esterase: Trace / RBC: >50 /HPF / WBC 0-2   Sq Epi: x / Non Sq Epi: x / Bacteria: Few      CAPILLARY BLOOD GLUCOSE          RADIOLOGY & ADDITIONAL TESTS:    Imaging reports  Personally Reviewed:  [ ] YES  [ ] NO    Consultant(s) Notes Reviewed:  [ ] YES  [ ] NO    Care Discussed with Consultants/Other Providers [x ] YES  [ ] NO  Problem/Plan - 1:  ·  Problem: Urinary tract infection due to ESBL Klebsiella.   ·  Plan: ivf  zosyn.    Problem/Plan - 2:  ·  Problem: H/O urinary retention.   ·  Plan: foly  cathater  flomax  urology  consult.    Problem/Plan - 3:  ·  Problem: Urinary tract infection with hematuria.   ·  Plan: ivf  ab  monitor  h/h.

## 2022-08-15 NOTE — CONSULT NOTE ADULT - ASSESSMENT
A/P: 62M w/ PMH BPH, indwelling almodovar since 8/2. Presents as a call back for MDR UTI.     --Continue almodovar  --Continue IV abx, recommend long course 4-6weeks per ID recommendations, should probably consider picc line placement   --Will attempt to obtain records from [facility], unclear whether patient wants to switch his care here  --Discussed that patient will most likely need another prostate reduction surgery once infection has cleared

## 2022-08-15 NOTE — CONSULT NOTE ADULT - SUBJECTIVE AND OBJECTIVE BOX
UROLOGY CONSULT NOTE    Patient is a 62y old  Male who presents with a chief complaint of MDR UTI (15 Aug 2022 08:17)      HPI:  62M w/ PMH BPH, indwelling almodovar since . Patient states that he presented to his outpt urologist office ([facility]?) for urinary retention and almodovar was placed on . He followed up on  and almodovar was removed and he passed ToV. He presented to ED here in urinary retention again on . At that time almodovar replaced and Ucx taken which grew MDR Klebsiella ESBL so patient was called back to ER.     Urology consulted. Patient states [place] and established care with urology at [facility] in 2016. States that he was having LUTS. Started on flomax. He states that PSA's have fluctuated. He has had an MRI in the past and states that he's had numerous biopsies all of which have been negative. States that in  he had Rezum with a Dr. Lomeli and that since the almodovar was removed after the procedure he's always felt a burning with urination. Currently receiving zosyn, with 16Fr almodovar in place draining blood tinged urine. Cr WNL, no leukocytosis, afebrile, otherwise vitals stable. Flomax restarted    PAST MEDICAL & SURGICAL HISTORY:  History of urinary retention  No significant past surgical history    REVIEW OF SYSTEMS:  Constitutional: Denies fever, weight loss, fatigue  Eye: Denies eye pain, visual changes, discharge, blurred vision  ENT: Denies hearing changes, tinnitus, vertigo, sinus congestion, sore throat  Neck: Denies pain or stiffness  Respiratory: Denies cough, wheezing, chills, hemoptysis, shortness of breath, difficulty breathing  Cardiovascular: Denies chest pain, palpitations, dizziness, leg swelling  Gastrointestinal: Denies abdominal pain, nausea, vomiting, hematemesis, diarrhea, constipation, melena, hematochezia  Genitourinary: Denies dysuria, frequency, hematuria, retention, incontinence  Neurological: Denies headaches, memory loss, loss of strength, numbness, tremors  Skin: Denies itching, burning, rashes, lesions   Endocrine: Denies heat or cold intolerance, hair loss  Musculoskeletal: Denies joint pain or swelling, back, extremity pain  Psychiatric: Denies depression, anxiety, mood swings, difficulty sleeping, suicidal ideation  Hematology: Denies easy bruising, bleeding gums  Immunologic: Denies hives or eczema    MEDICATIONS  (STANDING):  piperacillin/tazobactam IVPB.. 3.375 Gram(s) IV Intermittent every 8 hours  sodium chloride 0.9%. 1000 milliLiter(s) (75 mL/Hr) IV Continuous <Continuous>  tamsulosin 0.4 milliGRAM(s) Oral at bedtime    MEDICATIONS  (PRN):    Allergies  No Known Allergies    SOCIAL HISTORY          Smoking: Yes [ ]  No [x ]   ______pk yrs          ETOH  Yes [ ]  No [x ]  Social [ ]          DRUGS:  Yes [ ]  No [x ]  if so what______________    FAMILY HISTORY:      Vital Signs Last 24 Hrs  T(C): 36.8 (15 Aug 2022 11:23), Max: 36.8 (14 Aug 2022 20:50)  T(F): 98.2 (15 Aug 2022 11:23), Max: 98.2 (14 Aug 2022 20:50)  HR: 53 (15 Aug 2022 11:23) (50 - 90)  BP: 100/61 (15 Aug 2022 11:23) (100/61 - 144/80)  BP(mean): --  RR: 17 (15 Aug 2022 11:23) (15 - 18)  SpO2: 98% (15 Aug 2022 11:23) (96% - 99%)    Parameters below as of 15 Aug 2022 05:18  Patient On (Oxygen Delivery Method): room air        Physical Exam:    GENERAL: NAD, well-groomed, thin  HEAD:  Atraumatic, Normocephalic  EYES: EOMI, PERRLA, conjunctiva and sclera clear  NECK: Supple, No JVD, Normal thyroid  NERVOUS SYSTEM:  Alert & Oriented X3, Good concentration  CHEST/LUNG: Clear to percussion bilaterally  HEART: Regular rate and rhythm  ABDOMEN: Soft, mildly diffuse tenderness, Nondistended  EXTREMITIES:  2+ Peripheral Pulses  LYMPH: No cervical adenopathy  : No suprapubic tenderness, no bladder distention, no gross hematuria.  Genitalia: Uncircumcised phallus, adequate meatus, no hypospadias. Both testicles  descended, non tender, no mass. No epididymitis or epididymal mass. No  hydrocele.  Rectal Examination: Deferred  SKIN: No rashes or lesions      LABS:                        12.2   6.30  )-----------( 285      ( 15 Aug 2022 07:07 )             40.1     08-15    142  |  110<H>  |  10  ----------------------------<  93  4.3   |  28  |  1.06    Ca    8.6      15 Aug 2022 07:07    TPro  6.3  /  Alb  3.2<L>  /  TBili  0.3  /  DBili  x   /  AST  19  /  ALT  33  /  AlkPhos  62  08-15    PT/INR - ( 14 Aug 2022 15:52 )   PT: 11.7 sec;   INR: 0.98 ratio         PTT - ( 14 Aug 2022 15:52 )  PTT:31.5 sec  Urinalysis Basic - ( 14 Aug 2022 16:55 )    Color: Red / Appearance: bloody / S.010 / pH: x  Gluc: x / Ketone: Negative  / Bili: Negative / Urobili: Negative mg/dL   Blood: x / Protein: 500 mg/dL / Nitrite: Positive   Leuk Esterase: Trace / RBC: >50 /HPF / WBC 0-2   Sq Epi: x / Non Sq Epi: x / Bacteria: Few        RADIOLOGY & ADDITIONAL STUDIES:

## 2022-08-15 NOTE — CONSULT NOTE ADULT - CONSULT REQUESTED BY NAME
Called Tramadol into [facility] pharmacy
Last OV: 08/09/2017  Last Fill: 08/21/2017
Ezequiel Cruz MD
Dr. Cruz

## 2022-08-16 LAB
ANION GAP SERPL CALC-SCNC: 3 MMOL/L — LOW (ref 5–17)
BUN SERPL-MCNC: 11 MG/DL — SIGNIFICANT CHANGE UP (ref 7–23)
CALCIUM SERPL-MCNC: 8.7 MG/DL — SIGNIFICANT CHANGE UP (ref 8.5–10.1)
CHLORIDE SERPL-SCNC: 109 MMOL/L — HIGH (ref 96–108)
CO2 SERPL-SCNC: 29 MMOL/L — SIGNIFICANT CHANGE UP (ref 22–31)
CREAT SERPL-MCNC: 1.09 MG/DL — SIGNIFICANT CHANGE UP (ref 0.5–1.3)
CRP SERPL-MCNC: <3 MG/L — SIGNIFICANT CHANGE UP
EGFR: 76 ML/MIN/1.73M2 — SIGNIFICANT CHANGE UP
ERYTHROCYTE [SEDIMENTATION RATE] IN BLOOD: 4 MM/HR — SIGNIFICANT CHANGE UP (ref 0–20)
GLUCOSE SERPL-MCNC: 96 MG/DL — SIGNIFICANT CHANGE UP (ref 70–99)
HCT VFR BLD CALC: 41.3 % — SIGNIFICANT CHANGE UP (ref 39–50)
HGB BLD-MCNC: 12.7 G/DL — LOW (ref 13–17)
MCHC RBC-ENTMCNC: 25.9 PG — LOW (ref 27–34)
MCHC RBC-ENTMCNC: 30.8 G/DL — LOW (ref 32–36)
MCV RBC AUTO: 84.1 FL — SIGNIFICANT CHANGE UP (ref 80–100)
NRBC # BLD: 0 /100 WBCS — SIGNIFICANT CHANGE UP (ref 0–0)
PLATELET # BLD AUTO: 291 K/UL — SIGNIFICANT CHANGE UP (ref 150–400)
POTASSIUM SERPL-MCNC: 4.4 MMOL/L — SIGNIFICANT CHANGE UP (ref 3.5–5.3)
POTASSIUM SERPL-SCNC: 4.4 MMOL/L — SIGNIFICANT CHANGE UP (ref 3.5–5.3)
PROCALCITONIN SERPL-MCNC: 0.03 NG/ML — SIGNIFICANT CHANGE UP (ref 0.02–0.1)
RBC # BLD: 4.91 M/UL — SIGNIFICANT CHANGE UP (ref 4.2–5.8)
RBC # FLD: 14.9 % — HIGH (ref 10.3–14.5)
SODIUM SERPL-SCNC: 141 MMOL/L — SIGNIFICANT CHANGE UP (ref 135–145)
WBC # BLD: 6.24 K/UL — SIGNIFICANT CHANGE UP (ref 3.8–10.5)
WBC # FLD AUTO: 6.24 K/UL — SIGNIFICANT CHANGE UP (ref 3.8–10.5)

## 2022-08-16 PROCEDURE — 76775 US EXAM ABDO BACK WALL LIM: CPT | Mod: 26

## 2022-08-16 PROCEDURE — 93010 ELECTROCARDIOGRAM REPORT: CPT

## 2022-08-16 PROCEDURE — 99222 1ST HOSP IP/OBS MODERATE 55: CPT

## 2022-08-16 RX ORDER — PIPERACILLIN AND TAZOBACTAM 4; .5 G/20ML; G/20ML
3.38 INJECTION, POWDER, LYOPHILIZED, FOR SOLUTION INTRAVENOUS EVERY 8 HOURS
Refills: 0 | Status: DISCONTINUED | OUTPATIENT
Start: 2022-08-16 | End: 2022-08-17

## 2022-08-16 RX ADMIN — Medication 1 TABLET(S): at 05:39

## 2022-08-16 RX ADMIN — PIPERACILLIN AND TAZOBACTAM 25 GRAM(S): 4; .5 INJECTION, POWDER, LYOPHILIZED, FOR SOLUTION INTRAVENOUS at 21:34

## 2022-08-16 RX ADMIN — PIPERACILLIN AND TAZOBACTAM 25 GRAM(S): 4; .5 INJECTION, POWDER, LYOPHILIZED, FOR SOLUTION INTRAVENOUS at 11:11

## 2022-08-16 RX ADMIN — FINASTERIDE 5 MILLIGRAM(S): 5 TABLET, FILM COATED ORAL at 11:11

## 2022-08-16 RX ADMIN — PIPERACILLIN AND TAZOBACTAM 25 GRAM(S): 4; .5 INJECTION, POWDER, LYOPHILIZED, FOR SOLUTION INTRAVENOUS at 05:39

## 2022-08-16 RX ADMIN — Medication 1 TABLET(S): at 17:06

## 2022-08-16 RX ADMIN — TAMSULOSIN HYDROCHLORIDE 0.4 MILLIGRAM(S): 0.4 CAPSULE ORAL at 21:35

## 2022-08-16 NOTE — PROGRESS NOTE ADULT - NS ATTEND AMEND GEN_ALL_CORE FT
Aware of pt story and history of Eduardo in 2017.  Has massive gland and recurrent UTI's: gets sx's of increased frequency w smaller volume voids. He starts voiding only drops at a time.  Denies f/c/n/v or GH. Has failed several voiding trials.  Also, has h/o of PSA up to 15. Has had a few MRI studies and a few biopsies, all of which were negative.    AVSS  Exam: normal scrotum testes; slight induration of right epididymis. Uncircumcised  CICI will be done as out pt  Catheter draining clear yellow urine    Cr 1.07/GFR 70 ml/min  8/13/22 50-100K ESBL E Coli on Zosyn    US - no hydro; prostate 174 gm    Plan: f/u final cx's and sensitivities  Keep his Tamsulosin and Finasteride which he takes out patient  Wants to follow up with his primary urologist as well as us for second opinion on work up and recommendations  Keep foreskin reduced

## 2022-08-16 NOTE — PROGRESS NOTE ADULT - SUBJECTIVE AND OBJECTIVE BOX
INTERVAL HPI/OVERNIGHT EVENTS:    off  floor  for  testing    REVIEW OF SYSTEMS:  CONSTITUTIONAL:  reported   no  complaints    MEDICATION:  finasteride 5 milliGRAM(s) Oral daily  lactobacillus acidophilus 1 Tablet(s) Oral two times a day  piperacillin/tazobactam IVPB.. 3.375 Gram(s) IV Intermittent every 8 hours  sodium chloride 0.9%. 1000 milliLiter(s) IV Continuous <Continuous>  tamsulosin 0.4 milliGRAM(s) Oral at bedtime    Vital Signs Last 24 Hrs  T(C): 36.6 (16 Aug 2022 05:19), Max: 36.8 (15 Aug 2022 11:23)  T(F): 97.9 (16 Aug 2022 05:19), Max: 98.2 (15 Aug 2022 11:23)  HR: 49 (16 Aug 2022 05:19) (48 - 58)  BP: 120/64 (16 Aug 2022 05:19) (100/61 - 120/64)  BP(mean): --  RR: 18 (16 Aug 2022 05:19) (17 - 18)  SpO2: 98% (16 Aug 2022 05:19) (98% - 98%)    Parameters below as of 16 Aug 2022 05:19  Patient On (Oxygen Delivery Method): room air                                12.7   6.24  )-----------( 291      ( 16 Aug 2022 07:06 )             41.3     08-16    141  |  109<H>  |  11  ----------------------------<  96  4.4   |  29  |  1.09    Ca    8.7      16 Aug 2022 07:06    TPro  6.3  /  Alb  3.2<L>  /  TBili  0.3  /  DBili  x   /  AST  19  /  ALT  33  /  AlkPhos  62  08-15    PT/INR - ( 14 Aug 2022 15:52 )   PT: 11.7 sec;   INR: 0.98 ratio         PTT - ( 14 Aug 2022 15:52 )  PTT:31.5 sec  Urinalysis Basic - ( 14 Aug 2022 16:55 )    Color: Red / Appearance: bloody / S.010 / pH: x  Gluc: x / Ketone: Negative  / Bili: Negative / Urobili: Negative mg/dL   Blood: x / Protein: 500 mg/dL / Nitrite: Positive   Leuk Esterase: Trace / RBC: >50 /HPF / WBC 0-2   Sq Epi: x / Non Sq Epi: x / Bacteria: Few      CAPILLARY BLOOD GLUCOSE          RADIOLOGY & ADDITIONAL TESTS:    Imaging reports  Personally Reviewed:  [ ] YES  [ ] NO    Consultant(s) Notes Reviewed:  [x ] YES  [ ] NO    Care Discussed with Consultants/Other Providers [x ] YES  [ ] NO  Problem/Plan - 1:  ·  Problem: Urinary tract infection due to ESBL Klebsiella.   ·  Plan: ivf  zosyn. continue  as per  ID    Problem/Plan - 2:  ·  Problem: H/O urinary retention.   ·  Plan: foly  cathater  flomax  urology  consult. appreciated    Problem/Plan - 3:  ·  Problem: Urinary tract infection with hematuria.   ·  Plan: ivf  ab  monitor  h/h.

## 2022-08-16 NOTE — PROGRESS NOTE ADULT - SUBJECTIVE AND OBJECTIVE BOX
Patient seen and examined bedside resting comfortably.  No complaints offered.   Voiding spontaneously without difficulty.  Denies hematuria and dysuria. Denies nausea and vomiting. Tolerating diet.  Denies chest pain, dyspnea, cough.    T(F): 97.8 (08-16-22 @ 11:37), Max: 98 (08-16-22 @ 00:16)  HR: 65 (08-16-22 @ 11:37) (48 - 65)  BP: 120/71 (08-16-22 @ 11:37) (117/71 - 120/71)  RR: 18 (08-16-22 @ 11:37) (18 - 18)  SpO2: 98% (08-16-22 @ 11:37) (98% - 98%)    ROS:  Negative unless otherwise stated    PHYSICAL EXAM:    General: NAD, alert and awake  HEENT: NCAT, EOMI, conjunctiva clear  Chest: nonlabored respirations, CTA b/l.  Abdomen: soft, NT/ND.   Extremities: Calf soft, nontender b/l.   : No suprapubic tenderness or bladder distention.  Indwelling almodovar with clear urine    LABS:                        12.7   6.24  )-----------( 291      ( 16 Aug 2022 07:06 )             41.3   08-16    141  |  109<H>  |  11  ----------------------------<  96  4.4   |  29  |  1.09    Ca    8.7      16 Aug 2022 07:06    TPro  6.3  /  Alb  3.2<L>  /  TBili  0.3  /  DBili  x   /  AST  19  /  ALT  33  /  AlkPhos  62  08-15  PT/INR - ( 14 Aug 2022 15:52 )   PT: 11.7 sec;   INR: 0.98 ratio         PTT - ( 14 Aug 2022 15:52 )  PTT:31.5 sec  I&O's Detail    15 Aug 2022 07:01  -  16 Aug 2022 07:00  --------------------------------------------------------  IN:    IV PiggyBack: 200 mL    Oral Fluid: 100 mL    sodium chloride 0.9%: 900 mL  Total IN: 1200 mL    OUT:    Blood Loss (mL): 1500 mL    Indwelling Catheter - Urethral (mL): 3100 mL  Total OUT: 4600 mL    Total NET: -3400 mL   normal...

## 2022-08-16 NOTE — PROGRESS NOTE ADULT - SUBJECTIVE AND OBJECTIVE BOX
TMAX - 98.2    On day # 2 Zosyn    Vital Signs Last 24 Hrs  T(C): 36.6 (16 Aug 2022 11:37), Max: 36.7 (16 Aug 2022 00:16)  T(F): 97.8 (16 Aug 2022 11:37), Max: 98 (16 Aug 2022 00:16)  HR: 65 (16 Aug 2022 11:37) (48 - 65)  BP: 120/71 (16 Aug 2022 11:37) (117/71 - 120/71)  RR: 18 (16 Aug 2022 11:37) (18 - 18)  SpO2: 98% (16 Aug 2022 11:37) (98% - 98%)  Parameters below as of 16 Aug 2022 05:19  Patient On (Oxygen Delivery Method): room air  Supplemental O2: on RA    Awake, alert, no c/o abdominal pain or back pain and no further chills.  No SOB or cough reported and O2 Sat's remain 98% on RA.  Appetite is good.  Only c/o some continued discomfort secondary to the López Catheter.     PHYSICAL EXAM  General:  63 y/o black male awake, alert, sitting upright in bed now, pleasant and cooperative, in NAD  HEENT: conj pink, sclerae anicteric, PERRLA, no oral lesions noted  Neck: supple, no nodes noted  Heart: RR  Lungs: clear bilaterally  Abdomen: BS+, soft, nontender to palpation, no masses or HS-megaly detected  Back: no CVA or Spinal tenderness noted  Extremities: no edema LE's  Skin: warm, dry, no rash noted  López in place and draining  mostly clear light-yellow urine now  - 3100cc output recorded over the prior 24hrs.       I&O's Summary :    15 Aug 2022 07:01  -  16 Aug 2022 07:00  --------------------------------------------------------  IN: 1200 mL / OUT: 4600 mL / NET: -3400 mL    16 Aug 2022 07:01  -  16 Aug 2022 16:27  --------------------------------------------------------  IN: 0 mL / OUT: 1700 mL / NET: -1700 mL      LABS:  CBC Full  -  ( 16 Aug 2022 07:06 )  WBC Count : 6.24 K/uL  RBC Count : 4.91 M/uL  Hemoglobin : 12.7 g/dL  Hematocrit : 41.3 %  Platelet Count - Automated : 291 K/uL  Mean Cell Volume : 84.1 fl  Mean Cell Hemoglobin : 25.9 pg  Mean Cell Hemoglobin Concentration : 30.8 g/dL  Auto Neutrophil # : x  Auto Lymphocyte # : x  Auto Monocyte # : x  Auto Eosinophil # : x  Auto Basophil # : x  Auto Neutrophil % : x  Auto Lymphocyte % : x  Auto Monocyte % : x  Auto Eosinophil % : x  Auto Basophil % : x        141  |  109<H>  |  11  ----------------------------<  96  4.4   |  29  |  1.09    Ca    8.7      16 Aug 2022 07:06    TPro  6.3  /  Alb  3.2<L>  /  TBili  0.3  /  DBili  x   /  AST  19  /  ALT  33  /  AlkPhos  62  08-15    LIVER FUNCTIONS - ( 15 Aug 2022 07:07 )  Alb: 3.2 g/dL / Pro: 6.3 gm/dL / ALK PHOS: 62 U/L / ALT: 33 U/L / AST: 19 U/L / GGT: x           Urinalysis Basic - ( 14 Aug 2022 16:55 )  Color: Red / Appearance: bloody / S.010 / pH: x  Gluc: x / Ketone: Negative  / Bili: Negative / Urobili: Negative mg/dL   Blood: x / Protein: 500 mg/dL / Nitrite: Positive   Leuk Esterase: Trace / RBC: >50 /HPF / WBC 0-2   Sq Epi: x / Non Sq Epi: x / Bacteria: Few    Sedimentation Rate, Erythrocyte: 4 mm/hr ( @ 07:06)    C-Reactive Protein, Serum (22 @ 07:06)    C-Reactive Protein, Serum: <3: Test Repeated mg/L    Procalcitonin, Serum (22 @ 07:06)    Procalcitonin, Serum: 0.03:     Hepatitis C Antibody Test (08.15.22 @ 07:07)    Hepatitis C Virus S/CO Ratio: 0.09 S/CO    Hepatitis C Virus Interpretation: Nonreact: Hepatitis C AB    Lactate, Blood (22 @ 15:52)    Lactate, Blood: 1.2 mmol/L      MICROBIOLOGY:  Specimen Source: .Blood Blood-Peripheral ( @ 16:55)  Culture Results:   No growth to date. ( @ 16:55)    Specimen Source: .Urine ( @ 16:55)  Culture Results:   50,000 - 99,000 CFU/mL Klebsiella pneumoniae ( @ 16:55)    Specimen Source: .Blood Blood-Peripheral ( @ 16:15)  Culture Results:   No growth to date. ( @ 16:15)      Flu With COVID-19 By ELADIO (22 @ 15:52)    SARS-CoV-2 Result: NotDetec: EUA/IVD    Influenza A Result: NotDetec: EUA/IVD    Influenza B Result: NotDetec: EUA/IVD    Specimen Source: Catheterized Catheterized ( @ 10:00)  Culture Results:   >100,000 CFU/ml Klebsiella pneumoniae ESBL ( @ 10:00)  Culture - Urine (22 @ 10:00)    -  Amikacin: S <=16    -  Amoxicillin/Clavulanic Acid: I 16/8    -  Ampicillin: R >16 These ampicillin results predict results for amoxicillin    -  Ampicillin/Sulbactam: R >168 Enterobacter, Klebsiella aerogenes, Citrobacter, and Serratia may develop resistance during prolonged therapy (3-4 days)    -  Aztreonam: R >16    -  Cefazolin: R >16 ([email or web address]) For uncomplicated UTI with K. pneumoniae, E. coli, or P. mirablis: CEE <=16 is sensitive and CEE >=32 is resistant. This also predicts results for oral agents cefaclor, cefdinir, cefpodoxime, cefprozil, cefuroxime axetil, cephalexin and locarbef for uncomplicated UTI. Note that some isolates may be susceptible to these agents while testing resistant to cefazolin.    -  Cefepime: R >16    -  Ceftriaxone: R >32 Enterobacter, Klebsiella aerogenes, Citrobacter, and Serratia may develop resistance during prolonged therapy    -  Ciprofloxacin: R >2    -  Ertapenem: S <=0.5    -  Gentamicin: I 8    -  Imipenem: S <=1    -  Levofloxacin: R >4    -  Meropenem: S <=1    -  Nitrofurantoin: R >64 Should not be used to treat pyelonephritis    -  Piperacillin/Tazobactam: S 16    -  Tigecycline: S <=2    -  Tobramycin: R >8    -  Trimethoprim/Sulfamethoxazole: R >2/38    Organism Identification: Klebsiella pneumoniae ESBL    Organism: Klebsiella pneumoniae ESBL    Method Type: CEE    HIV-1/2 Antigen/Antibody Screen by CMIA (08.15.22 @ 07:07)    HIV-1/2 Combo Result: Nonreact:      RADIOLOGY:  < from: US Renal (22 @ 09:29) >  ACC: [id number] EXAM:  US KIDNEY(S)                        PROCEDURE DATE:  2022    INTERPRETATION:  CLINICAL INFORMATION: BPH, hematuria, retention  COMPARISON: None available.  TECHNIQUE: Sonography of the kidneys and bladder.  FINDINGS:  Right kidney: 10.6 cm. No renal mass, hydronephrosis or calculi.  Left kidney: 11.7 cm. No renal mass, hydronephrosis or calculi.  Urinary bladder: Decompressed with López  Exceedingly large prostate gland (8.8 x 5.6 x 6.7 cm) for a volume of 1   74 cc  IMPRESSION:  Unremarkable kidneys. No hydronephrosis.  Exceedingly large prostate gland.      Impression:  63 y/o black  male with hx of BPH, s/p Rezum procedure in  and did well until he had an episode of Urinary Retention in  requiring López placement and then removal with a UTI at the time, with a recurrent episode of Urinary Retention requiring Ólpez placement on 22 at [facility]  which was subsequently removed in his Urologist's Office on 22, but patient again developed Urinary retention and came to the [facility] ER on 22 where a López was replaced and patient was rx'ed with Cefdinir empirically for a presumed UTI, now admitted via the ER to [facility] on 22 after being called by the ER to return due to Urine Cx results showing growth of an ESBL Kleb pneumoniae in the Urine Cx.    Patient reports having an elevated PSA and previously underwent w/u with an MRI which was reported as normal.  In the ER patient was found to be afebrile and WBC's were WNL and he was noted to have visible hematuria.  Lactate was WNL and 2 Blood Cx's and a new Urine Cx were obtained and he was begun on rx with Zosyn.  Now 2 out of 2 Blood Cx's from 22 are negative thus far and repeat Urine Cx from 22 is now reported with 50,000 - 99,000 cfu of ESBL Kleb pneumo with sensitivities pending.  Patient remains afebrile and Renal Sono done this AM is WNL with no evidence of hydro, mass, or calculi but reported with an exceedingly large prostate. ESR, CRP, and Procalcitonin are WNL. Noted now with marked clearing of the initial gross hematuria.    Suggestions:  Braxton therefore continue present ab rx with Zosyn and follow-up Blood and Urine Cx's.  Continue Bacid rx while on ab's.  Follow-up temps and labs closely.  Patient will likely require a minimum of 7 days of IV Zosyn rx for his severe Hemorrhagic Cystitis secondary to ESBL Kleb pneumo ( pending further Cx results ).  He will also require further Urologic intervention for his significant BPH with secondary Urinary retention.  Discussed in detail with patient at the bedside. TMAX - 98.2    On day # 2 Zosyn    Vital Signs Last 24 Hrs  T(C): 36.6 (16 Aug 2022 11:37), Max: 36.7 (16 Aug 2022 00:16)  T(F): 97.8 (16 Aug 2022 11:37), Max: 98 (16 Aug 2022 00:16)  HR: 65 (16 Aug 2022 11:37) (48 - 65)  BP: 120/71 (16 Aug 2022 11:37) (117/71 - 120/71)  RR: 18 (16 Aug 2022 11:37) (18 - 18)  SpO2: 98% (16 Aug 2022 11:37) (98% - 98%)  Parameters below as of 16 Aug 2022 05:19  Patient On (Oxygen Delivery Method): room air  Supplemental O2: on RA    Awake, alert, no c/o abdominal pain or back pain and no further chills.  No SOB or cough reported and O2 Sat's remain 98% on RA.  Appetite is good.  Only c/o some continued discomfort secondary to the López Catheter.     PHYSICAL EXAM  General:  61 y/o black male awake, alert, sitting upright in bed now, pleasant and cooperative, in NAD  HEENT: conj pink, sclerae anicteric, PERRLA, no oral lesions noted  Neck: supple, no nodes noted  Heart: RR  Lungs: clear bilaterally  Abdomen: BS+, soft, nontender to palpation, no masses or HS-megaly detected  Back: no CVA or Spinal tenderness noted  Extremities: no edema LE's  Skin: warm, dry, no rash noted  López in place and draining  mostly clear light-yellow urine now  - 3100cc output recorded over the prior 24hrs.       I&O's Summary :    15 Aug 2022 07:01  -  16 Aug 2022 07:00  --------------------------------------------------------  IN: 1200 mL / OUT: 4600 mL / NET: -3400 mL    16 Aug 2022 07:01  -  16 Aug 2022 16:27  --------------------------------------------------------  IN: 0 mL / OUT: 1700 mL / NET: -1700 mL      LABS:  CBC Full  -  ( 16 Aug 2022 07:06 )  WBC Count : 6.24 K/uL  RBC Count : 4.91 M/uL  Hemoglobin : 12.7 g/dL  Hematocrit : 41.3 %  Platelet Count - Automated : 291 K/uL  Mean Cell Volume : 84.1 fl  Mean Cell Hemoglobin : 25.9 pg  Mean Cell Hemoglobin Concentration : 30.8 g/dL  Auto Neutrophil # : x  Auto Lymphocyte # : x  Auto Monocyte # : x  Auto Eosinophil # : x  Auto Basophil # : x  Auto Neutrophil % : x  Auto Lymphocyte % : x  Auto Monocyte % : x  Auto Eosinophil % : x  Auto Basophil % : x        141  |  109<H>  |  11  ----------------------------<  96  4.4   |  29  |  1.09    Ca    8.7      16 Aug 2022 07:06    TPro  6.3  /  Alb  3.2<L>  /  TBili  0.3  /  DBili  x   /  AST  19  /  ALT  33  /  AlkPhos  62  08-15    LIVER FUNCTIONS - ( 15 Aug 2022 07:07 )  Alb: 3.2 g/dL / Pro: 6.3 gm/dL / ALK PHOS: 62 U/L / ALT: 33 U/L / AST: 19 U/L / GGT: x           Urinalysis Basic - ( 14 Aug 2022 16:55 )  Color: Red / Appearance: bloody / S.010 / pH: x  Gluc: x / Ketone: Negative  / Bili: Negative / Urobili: Negative mg/dL   Blood: x / Protein: 500 mg/dL / Nitrite: Positive   Leuk Esterase: Trace / RBC: >50 /HPF / WBC 0-2   Sq Epi: x / Non Sq Epi: x / Bacteria: Few    Sedimentation Rate, Erythrocyte: 4 mm/hr ( @ 07:06)    C-Reactive Protein, Serum (22 @ 07:06)    C-Reactive Protein, Serum: <3: Test Repeated mg/L    Procalcitonin, Serum (22 @ 07:06)    Procalcitonin, Serum: 0.03:     Hepatitis C Antibody Test (08.15.22 @ 07:07)    Hepatitis C Virus S/CO Ratio: 0.09 S/CO    Hepatitis C Virus Interpretation: Nonreact: Hepatitis C AB    Lactate, Blood (22 @ 15:52)    Lactate, Blood: 1.2 mmol/L      MICROBIOLOGY:  Specimen Source: .Blood Blood-Peripheral ( @ 16:55)  Culture Results:   No growth to date. ( @ 16:55)    Specimen Source: .Urine ( @ 16:55)  Culture Results:   50,000 - 99,000 CFU/mL Klebsiella pneumoniae ( @ 16:55)    Specimen Source: .Blood Blood-Peripheral ( @ 16:15)  Culture Results:   No growth to date. ( @ 16:15)      Flu With COVID-19 By ELADIO (22 @ 15:52)    SARS-CoV-2 Result: NotDetec: EUA/IVD    Influenza A Result: NotDetec: EUA/IVD    Influenza B Result: NotDetec: EUA/IVD    Specimen Source: Catheterized Catheterized ( @ 10:00)  Culture Results:   >100,000 CFU/ml Klebsiella pneumoniae ESBL ( @ 10:00)  Culture - Urine (22 @ 10:00)    -  Amikacin: S <=16    -  Amoxicillin/Clavulanic Acid: I 16/8    -  Ampicillin: R >16 These ampicillin results predict results for amoxicillin    -  Ampicillin/Sulbactam: R >168 Enterobacter, Klebsiella aerogenes, Citrobacter, and Serratia may develop resistance during prolonged therapy (3-4 days)    -  Aztreonam: R >16    -  Cefazolin: R >16 ([email or web address]_COM_[email or web address]) For uncomplicated UTI with K. pneumoniae, E. coli, or P. mirablis: CEE <=16 is sensitive and CEE >=32 is resistant. This also predicts results for oral agents cefaclor, cefdinir, cefpodoxime, cefprozil, cefuroxime axetil, cephalexin and locarbef for uncomplicated UTI. Note that some isolates may be susceptible to these agents while testing resistant to cefazolin.    -  Cefepime: R >16    -  Ceftriaxone: R >32 Enterobacter, Klebsiella aerogenes, Citrobacter, and Serratia may develop resistance during prolonged therapy    -  Ciprofloxacin: R >2    -  Ertapenem: S <=0.5    -  Gentamicin: I 8    -  Imipenem: S <=1    -  Levofloxacin: R >4    -  Meropenem: S <=1    -  Nitrofurantoin: R >64 Should not be used to treat pyelonephritis    -  Piperacillin/Tazobactam: S 16    -  Tigecycline: S <=2    -  Tobramycin: R >8    -  Trimethoprim/Sulfamethoxazole: R >2/38    Organism Identification: Klebsiella pneumoniae ESBL    Organism: Klebsiella pneumoniae ESBL    Method Type: CEE    HIV-1/2 Antigen/Antibody Screen by CMIA (08.15.22 @ 07:07)    HIV-1/2 Combo Result: Nonreact:      RADIOLOGY:  < from: US Renal (22 @ 09:29) >  ACC: [id number] EXAM:  US KIDNEY(S)                        PROCEDURE DATE:  2022    INTERPRETATION:  CLINICAL INFORMATION: BPH, hematuria, retention  COMPARISON: None available.  TECHNIQUE: Sonography of the kidneys and bladder.  FINDINGS:  Right kidney: 10.6 cm. No renal mass, hydronephrosis or calculi.  Left kidney: 11.7 cm. No renal mass, hydronephrosis or calculi.  Urinary bladder: Decompressed with López  Exceedingly large prostate gland (8.8 x 5.6 x 6.7 cm) for a volume of 1   74 cc  IMPRESSION:  Unremarkable kidneys. No hydronephrosis.  Exceedingly large prostate gland.      Impression:  61 y/o black  male with hx of BPH, s/p Rezum procedure in  and did well until he had an episode of Urinary Retention in  requiring López placement and then removal with a UTI at the time, with a recurrent episode of Urinary Retention requiring López placement on 22 at [facility]  which was subsequently removed in his Urologist's Office on 22, but patient again developed Urinary retention and came to the [facility] ER on 22 where a López was replaced and patient was rx'ed with Cefdinir empirically for a presumed UTI, now admitted via the ER to [facility] on 22 after being called by the ER to return due to Urine Cx results showing growth of an ESBL Kleb pneumoniae in the Urine Cx.    Patient reports having an elevated PSA and previously underwent w/u with an MRI which was reported as normal.  In the ER patient was found to be afebrile and WBC's were WNL and he was noted to have visible hematuria.  Lactate was WNL and 2 Blood Cx's and a new Urine Cx were obtained and he was begun on rx with Zosyn.  Now 2 out of 2 Blood Cx's from 22 are negative thus far and repeat Urine Cx from 22 is now reported with 50,000 - 99,000 cfu of ESBL Kleb pneumo with sensitivities pending.  Patient remains afebrile and Renal Sono done this AM is WNL with no evidence of hydro, mass, or calculi but reported with an exceedingly large prostate. ESR, CRP, and Procalcitonin are WNL. Noted now with marked clearing of the initial gross hematuria.    Suggestions:  Will therefore continue present ab rx with Zosyn and follow-up Blood and Urine Cx's.  Continue Bacid rx while on ab's.  Follow-up temps and labs closely.  Patient will likely require a minimum of 7 days of IV Zosyn rx for his severe Hemorrhagic Cystitis secondary to ESBL Kleb pneumo ( pending further Cx results ).  He will also require further Urologic intervention for his significant BPH with secondary Urinary retention.  Discussed in detail with patient at the bedside.

## 2022-08-17 LAB
-  AMIKACIN: SIGNIFICANT CHANGE UP
-  AMOXICILLIN/CLAVULANIC ACID: SIGNIFICANT CHANGE UP
-  AMPICILLIN/SULBACTAM: SIGNIFICANT CHANGE UP
-  AMPICILLIN: SIGNIFICANT CHANGE UP
-  AZTREONAM: SIGNIFICANT CHANGE UP
-  CEFAZOLIN: SIGNIFICANT CHANGE UP
-  CEFEPIME: SIGNIFICANT CHANGE UP
-  CEFTRIAXONE: SIGNIFICANT CHANGE UP
-  CIPROFLOXACIN: SIGNIFICANT CHANGE UP
-  ERTAPENEM: SIGNIFICANT CHANGE UP
-  GENTAMICIN: SIGNIFICANT CHANGE UP
-  IMIPENEM: SIGNIFICANT CHANGE UP
-  LEVOFLOXACIN: SIGNIFICANT CHANGE UP
-  MEROPENEM: SIGNIFICANT CHANGE UP
-  NITROFURANTOIN: SIGNIFICANT CHANGE UP
-  PIPERACILLIN/TAZOBACTAM: SIGNIFICANT CHANGE UP
-  TIGECYCLINE: SIGNIFICANT CHANGE UP
-  TOBRAMYCIN: SIGNIFICANT CHANGE UP
-  TRIMETHOPRIM/SULFAMETHOXAZOLE: SIGNIFICANT CHANGE UP
ANION GAP SERPL CALC-SCNC: 5 MMOL/L — SIGNIFICANT CHANGE UP (ref 5–17)
BASOPHILS # BLD AUTO: 0.08 K/UL — SIGNIFICANT CHANGE UP (ref 0–0.2)
BASOPHILS NFR BLD AUTO: 1.2 % — SIGNIFICANT CHANGE UP (ref 0–2)
BUN SERPL-MCNC: 14 MG/DL — SIGNIFICANT CHANGE UP (ref 7–23)
CALCIUM SERPL-MCNC: 8.7 MG/DL — SIGNIFICANT CHANGE UP (ref 8.5–10.1)
CHLORIDE SERPL-SCNC: 108 MMOL/L — SIGNIFICANT CHANGE UP (ref 96–108)
CO2 SERPL-SCNC: 27 MMOL/L — SIGNIFICANT CHANGE UP (ref 22–31)
CREAT SERPL-MCNC: 1.11 MG/DL — SIGNIFICANT CHANGE UP (ref 0.5–1.3)
CULTURE RESULTS: SIGNIFICANT CHANGE UP
EGFR: 75 ML/MIN/1.73M2 — SIGNIFICANT CHANGE UP
EOSINOPHIL # BLD AUTO: 0.46 K/UL — SIGNIFICANT CHANGE UP (ref 0–0.5)
EOSINOPHIL NFR BLD AUTO: 6.9 % — HIGH (ref 0–6)
GLUCOSE SERPL-MCNC: 93 MG/DL — SIGNIFICANT CHANGE UP (ref 70–99)
HCT VFR BLD CALC: 41.5 % — SIGNIFICANT CHANGE UP (ref 39–50)
HGB BLD-MCNC: 12.7 G/DL — LOW (ref 13–17)
IMM GRANULOCYTES NFR BLD AUTO: 0.3 % — SIGNIFICANT CHANGE UP (ref 0–1.5)
LYMPHOCYTES # BLD AUTO: 1.84 K/UL — SIGNIFICANT CHANGE UP (ref 1–3.3)
LYMPHOCYTES # BLD AUTO: 27.5 % — SIGNIFICANT CHANGE UP (ref 13–44)
MCHC RBC-ENTMCNC: 25.9 PG — LOW (ref 27–34)
MCHC RBC-ENTMCNC: 30.6 G/DL — LOW (ref 32–36)
MCV RBC AUTO: 84.7 FL — SIGNIFICANT CHANGE UP (ref 80–100)
METHOD TYPE: SIGNIFICANT CHANGE UP
MONOCYTES # BLD AUTO: 0.67 K/UL — SIGNIFICANT CHANGE UP (ref 0–0.9)
MONOCYTES NFR BLD AUTO: 10 % — SIGNIFICANT CHANGE UP (ref 2–14)
NEUTROPHILS # BLD AUTO: 3.63 K/UL — SIGNIFICANT CHANGE UP (ref 1.8–7.4)
NEUTROPHILS NFR BLD AUTO: 54.1 % — SIGNIFICANT CHANGE UP (ref 43–77)
NRBC # BLD: 0 /100 WBCS — SIGNIFICANT CHANGE UP (ref 0–0)
ORGANISM # SPEC MICROSCOPIC CNT: SIGNIFICANT CHANGE UP
ORGANISM # SPEC MICROSCOPIC CNT: SIGNIFICANT CHANGE UP
PLATELET # BLD AUTO: 308 K/UL — SIGNIFICANT CHANGE UP (ref 150–400)
POTASSIUM SERPL-MCNC: 4 MMOL/L — SIGNIFICANT CHANGE UP (ref 3.5–5.3)
POTASSIUM SERPL-SCNC: 4 MMOL/L — SIGNIFICANT CHANGE UP (ref 3.5–5.3)
RBC # BLD: 4.9 M/UL — SIGNIFICANT CHANGE UP (ref 4.2–5.8)
RBC # FLD: 15 % — HIGH (ref 10.3–14.5)
SODIUM SERPL-SCNC: 140 MMOL/L — SIGNIFICANT CHANGE UP (ref 135–145)
SPECIMEN SOURCE: SIGNIFICANT CHANGE UP
WBC # BLD: 6.7 K/UL — SIGNIFICANT CHANGE UP (ref 3.8–10.5)
WBC # FLD AUTO: 6.7 K/UL — SIGNIFICANT CHANGE UP (ref 3.8–10.5)

## 2022-08-17 RX ORDER — MEROPENEM 1 G/30ML
INJECTION INTRAVENOUS
Refills: 0 | Status: DISCONTINUED | OUTPATIENT
Start: 2022-08-17 | End: 2022-08-23

## 2022-08-17 RX ORDER — MEROPENEM 1 G/30ML
1000 INJECTION INTRAVENOUS EVERY 8 HOURS
Refills: 0 | Status: DISCONTINUED | OUTPATIENT
Start: 2022-08-17 | End: 2022-08-23

## 2022-08-17 RX ORDER — MEROPENEM 1 G/30ML
1000 INJECTION INTRAVENOUS ONCE
Refills: 0 | Status: COMPLETED | OUTPATIENT
Start: 2022-08-17 | End: 2022-08-17

## 2022-08-17 RX ADMIN — TAMSULOSIN HYDROCHLORIDE 0.4 MILLIGRAM(S): 0.4 CAPSULE ORAL at 22:02

## 2022-08-17 RX ADMIN — Medication 1 TABLET(S): at 05:40

## 2022-08-17 RX ADMIN — MEROPENEM 100 MILLIGRAM(S): 1 INJECTION INTRAVENOUS at 22:02

## 2022-08-17 RX ADMIN — Medication 1 TABLET(S): at 17:47

## 2022-08-17 RX ADMIN — MEROPENEM 100 MILLIGRAM(S): 1 INJECTION INTRAVENOUS at 15:11

## 2022-08-17 RX ADMIN — PIPERACILLIN AND TAZOBACTAM 25 GRAM(S): 4; .5 INJECTION, POWDER, LYOPHILIZED, FOR SOLUTION INTRAVENOUS at 05:40

## 2022-08-17 RX ADMIN — FINASTERIDE 5 MILLIGRAM(S): 5 TABLET, FILM COATED ORAL at 11:45

## 2022-08-17 NOTE — PROGRESS NOTE ADULT - SUBJECTIVE AND OBJECTIVE BOX
TMAX - 98.5    On day # 3 Zosyn    Vital Signs Last 24 Hrs  T(C): 36.4 (17 Aug 2022 11:26), Max: 36.9 (16 Aug 2022 17:05)  T(F): 97.5 (17 Aug 2022 11:26), Max: 98.5 (16 Aug 2022 17:05)  HR: 64 (17 Aug 2022 11:26) (50 - 64)  BP: 106/67 (17 Aug 2022 11:26) (106/67 - 122/74)  BP(mean): --  RR: 18 (17 Aug 2022 11:26) (18 - 19)  SpO2: 96% (17 Aug 2022 11:26) (96% - 99%)  Parameters below as of 17 Aug 2022 11:26  Patient On (Oxygen Delivery Method): room air  Supplemental O2:  on RA     Awake, alert, no c/o fevers or chills and no abdominal/ suprapubic pain and no c/o back/ flank pain.  No N/V/D reported but still with discomfort secondary to the indwelling López.  O2 Sat's remain between 96 -98% on RA.  Patient reports some hematuria noted earlier today but with subsequent clearing.    PHYSICAL EXAM  General:  61 y/o black male awake, alert, sitting upright in bed now, pleasant and cooperative, in NAD  HEENT: conj pink, sclerae anicteric, PERRLA, no oral lesions noted  Neck: supple, no nodes noted  Heart: RR  Lungs: clear bilaterally  Abdomen: BS+, soft, nontender to palpation, no masses or HS-megaly detected  Back: no CVA or Spinal tenderness noted  Extremities: no edema LE's  Skin: warm, dry, no rash noted  López in place and draining  mostly clear light-yellow urine now  but with some get/ slight blood tinged urine in the collection bag noted - 3175 cc output recorded over the prior 24hrs.        I&O's Summary :    16 Aug 2022 07:01  -  17 Aug 2022 07:00  --------------------------------------------------------  IN: 0 mL / OUT: 3175 mL / NET: -3175 mL    17 Aug 2022 07:01  -  17 Aug 2022 15:43  --------------------------------------------------------  IN: 1730 mL / OUT: 1600 mL / NET: 130 mL      LABS:  CBC Full  -  ( 17 Aug 2022 06:30 )  WBC Count : 6.70 K/uL  RBC Count : 4.90 M/uL  Hemoglobin : 12.7 g/dL  Hematocrit : 41.5 %  Platelet Count - Automated : 308 K/uL  Mean Cell Volume : 84.7 fl  Mean Cell Hemoglobin : 25.9 pg  Mean Cell Hemoglobin Concentration : 30.6 g/dL  Auto Neutrophil # : 3.63 K/uL  Auto Lymphocyte # : 1.84 K/uL  Auto Monocyte # : 0.67 K/uL  Auto Eosinophil # : 0.46 K/uL  Auto Basophil # : 0.08 K/uL  Auto Neutrophil % : 54.1 %  Auto Lymphocyte % : 27.5 %  Auto Monocyte % : 10.0 %  Auto Eosinophil % : 6.9 %  Auto Basophil % : 1.2 %    08-17    140  |  108  |  14  ----------------------------<  93  4.0   |  27  |  1.11    Ca    8.7      17 Aug 2022 06:30    Sedimentation Rate, Erythrocyte: 4 mm/hr (08-16 @ 07:06)    C-Reactive Protein, Serum (08.16.22 @ 07:06)    C-Reactive Protein, Serum: <3: Test Repeated mg/L    Procalcitonin, Serum (08.16.22 @ 07:06)    Procalcitonin, Serum: 0.03:     Hepatitis C Antibody Test (08.15.22 @ 07:07)    Hepatitis C Virus S/CO Ratio: 0.09 S/CO    Hepatitis C Virus Interpretation: Nonreact: Hepatitis C AB    Lactate, Blood (08.14.22 @ 15:52)    Lactate, Blood: 1.2 mmol/L      MICROBIOLOGY:  Specimen Source: .Blood Blood-Peripheral (08-14 @ 16:55)  Culture Results:   No growth to date. (08-14 @ 16:55)    Specimen Source: .Urine (08-14 @ 16:55)  Culture Results:   50,000 - 99,000 CFU/mL Klebsiella pneumoniae ESBL (08-14 @ 16:55)    -  Trimethoprim/Sulfamethoxazole: R >2/38    -  Gentamicin: I 8    -  Imipenem: S <=1    -  Levofloxacin: R >4    -  Meropenem: S <=1    -  Nitrofurantoin: R >64 Should not be used to treat pyelonephritis    -  Piperacillin/Tazobactam: I 32    -  Tigecycline: S <=2    -  Tobramycin: R >8    -  Amikacin: S <=16    -  Amoxicillin/Clavulanic Acid: R >16/8    -  Ampicillin: R >16 These ampicillin results predict results for amoxicillin    -  Ampicillin/Sulbactam: R >16/8 Enterobacter, Klebsiella aerogenes, Citrobacter, and Serratia may develop resistance during prolonged therapy (3-4 days)    -  Aztreonam: R >16    -  Cefazolin: R >16 (MIC_CL_COM_ENTERIC_[id number]) For uncomplicated UTI with K. pneumoniae, E. coli, or P. mirablis: CEE <=16 is sensitive and CEE >=32 is resistant. This also predicts results for oral agents cefaclor, cefdinir, cefpodoxime, cefprozil, cefuroxime axetil, cephalexin and locarbef for uncomplicated UTI. Note that some isolates may be susceptible to these agents while testing resistant to cefazolin.    -  Cefepime: R >16    -  Ceftriaxone: R >32 Enterobacter, Klebsiella aerogenes, Citrobacter, and Serratia may develop resistance during prolonged therapy    -  Ciprofloxacin: R >2    -  Ertapenem: S <=0.5    Organism Identification: Klebsiella pneumoniae ESBL    Method Type: CEE    Specimen Source: .Blood Blood-Peripheral (08-14 @ 16:15)  Culture Results:   No growth to date. (08-14 @ 16:15)      Flu With COVID-19 By ELADIO (08.14.22 @ 15:52)    SARS-CoV-2 Result: NotDetec: EUA/IVD    Influenza A Result: NotDetec: EUA/IVD    Influenza B Result: NotDetec: EUA/IVD    Specimen Source: Catheterized Catheterized (08-09 @ 10:00)  Culture Results:   >100,000 CFU/ml Klebsiella pneumoniae ESBL (08-09 @ 10:00)  Culture - Urine (08.09.22 @ 10:00)    -  Amikacin: S <=16    -  Amoxicillin/Clavulanic Acid: I 16/8    -  Ampicillin: R >16 These ampicillin results predict results for amoxicillin    -  Ampicillin/Sulbactam: R >16/8 Enterobacter, Klebsiella aerogenes, Citrobacter, and Serratia may develop resistance during prolonged therapy (3-4 days)    -  Aztreonam: R >16    -  Cefazolin: R >16 (MIC_CL_COM_ENTERIC_CEFAZU) For uncomplicated UTI with K. pneumoniae, E. coli, or P. mirablis: ECE <=16 is sensitive and CEE >=32 is resistant. This also predicts results for oral agents cefaclor, cefdinir, cefpodoxime, cefprozil, cefuroxime axetil, cephalexin and locarbef for uncomplicated UTI. Note that some isolates may be susceptible to these agents while testing resistant to cefazolin.    -  Cefepime: R >16    -  Ceftriaxone: R >32 Enterobacter, Klebsiella aerogenes, Citrobacter, and Serratia may develop resistance during prolonged therapy    -  Ciprofloxacin: R >2    -  Ertapenem: S <=0.5    -  Gentamicin: I 8    -  Imipenem: S <=1    -  Levofloxacin: R >4    -  Meropenem: S <=1    -  Nitrofurantoin: R >64 Should not be used to treat pyelonephritis    -  Piperacillin/Tazobactam: S 16    -  Tigecycline: S <=2    -  Tobramycin: R >8    -  Trimethoprim/Sulfamethoxazole: R >2/38    Organism Identification: Klebsiella pneumoniae ESBL    Organism: Klebsiella pneumoniae ESBL    Method Type: CEE    HIV-1/2 Antigen/Antibody Screen by [facility] (08.15.22 @ 07:07)    HIV-1/2 Combo Result: Nonreact:    RADIOLOGY:  < from: US Renal (08.16.22 @ 09:29) >  ACC: [id number] EXAM:  US KIDNEY(S)                        PROCEDURE DATE:  08/16/2022    INTERPRETATION:  CLINICAL INFORMATION: BPH, hematuria, retention  COMPARISON: None available.  TECHNIQUE: Sonography of the kidneys and bladder.  FINDINGS:  Right kidney: 10.6 cm. No renal mass, hydronephrosis or calculi.  Left kidney: 11.7 cm. No renal mass, hydronephrosis or calculi.  Urinary bladder: Decompressed with López  Exceedingly large prostate gland (8.8 x 5.6 x 6.7 cm) for a volume of 1   74 cc  IMPRESSION:  Unremarkable kidneys. No hydronephrosis.  Exceedingly large prostate gland.      Impression:  61 y/o black  male with hx of BPH, s/p Rezum procedure in 2017 and did well until he had an episode of Urinary Retention in 2020 requiring López placement and then removal with a UTI at the time, with a recurrent episode of Urinary Retention requiring López placement on 8/2/22 at [facility]  which was subsequently removed in his Urologist's Office on 8/8/22, but patient again developed Urinary retention and came to the [facility] ER on 8/9/22 where a López was replaced and patient was rx'ed with Cefdinir empirically for a presumed UTI, now admitted via the ER to [facility] on 8/14/22 after being called by the ER to return due to Urine Cx results showing growth of an ESBL Kleb pneumoniae in the Urine Cx.  Patient reports having an elevated PSA and previously underwent w/u with an MRI which was reported as normal.  In the ER patient was found to be afebrile and WBC's were WNL and he was noted to have visible hematuria.  Lactate was WNL and 2 Blood Cx's and a new Urine Cx were obtained and he was begun on rx with Zosyn.  Now 2 out of 2 Blood Cx's from 8/14/22 are negative thus far and repeat Urine Cx from 8/14/22 is now reported with 50,000 - 99,000 cfu of ESBL Kleb pneumo with sensitivities pending.  Patient remains afebrile and Renal Sono done this AM is WNL with no evidence of hydro, mass, or calculi but reported with an exceedingly large prostate. ESR, CRP, and Procalcitonin are WNL. Noted now with marked clearing of the initial gross hematuria.  Repeat Urine Cx from 8/14/22 now finalized with growth of ESBL Kleb pneumo which is now reported as only Intermediately sensitive to the Zosyn.    Suggestions:  Will therefore change ab rx  to Meropenem to continue rx for the ESBL Kleb Pneumo UTI  and continue Bacid rx while on ab's.  Follow-up temps and labs closely.  Patient will likely require a minimum of 7 days of IV Ab rx for his severe Hemorrhagic Cystitis secondary to ESBL Kleb pneumo.   He will also require further Urologic intervention for his significant BPH with secondary Urinary retention.  Discussed in detail with patient  and his wife at the bedside.

## 2022-08-17 NOTE — PROGRESS NOTE ADULT - SUBJECTIVE AND OBJECTIVE BOX
UROLOGY PROGRESS NOTE:     Subjective: Patient seen and examined at bedside. No complaints this am      Objective:  Vital signs  T(F): , Max: 98.5 (08-16-22 @ 17:05)  HR: 50 (08-17-22 @ 04:53)  BP: 114/67 (08-17-22 @ 04:53)  SpO2: 96% (08-17-22 @ 04:53)  Wt(kg): --    Output     I&O's Detail    16 Aug 2022 07:01  -  17 Aug 2022 07:00  --------------------------------------------------------  IN:  Total IN: 0 mL    OUT:    Voided (mL): 3175 mL  Total OUT: 3175 mL    Total NET: -3175 mL      17 Aug 2022 07:01  -  17 Aug 2022 11:16  --------------------------------------------------------  IN:    IV PiggyBack: 200 mL    Oral Fluid: 340 mL    sodium chloride 0.9%: 900 mL  Total IN: 1440 mL    OUT:    Indwelling Catheter - Urethral (mL): 1600 mL  Total OUT: 1600 mL    Total NET: -160 mL          Physical Exam:  Gen: no acute distress  Back: no CVAT b/l  Abd: soft NT ND  : almodovar in place, urine clear yellow    Labs:                        12.7   6.70  )-----------( 308      ( 17 Aug 2022 06:30 )             41.5     08-17    140  |  108  |  14  ----------------------------<  93  4.0   |  27  |  1.11    Ca    8.7      17 Aug 2022 06:30            Urine Cx: 8/14 50-99K Klebsiella

## 2022-08-17 NOTE — PROGRESS NOTE ADULT - SUBJECTIVE AND OBJECTIVE BOX
INTERVAL HPI/OVERNIGHT EVENTS:    continues  with  blood  tinged  urine  in  almodovar    REVIEW OF SYSTEMS:  CONSTITUTIONAL:  no  complaints    NECK: No pain or stiffnes  RESPIRATORY: No SOB   CARDIOVASCULAR: No chest pain, palpitations, dizziness,   GASTROINTESTINAL: No abdominal pain. No nausea, vomiting,   NEUROLOGICAL: No headaches, no  blurry  vision no  dizziness  SKIN: No itching,   MUSCULOSKELETAL: No pain    MEDICATION:  finasteride 5 milliGRAM(s) Oral daily  lactobacillus acidophilus 1 Tablet(s) Oral two times a day  piperacillin/tazobactam IVPB.. 3.375 Gram(s) IV Intermittent every 8 hours  sodium chloride 0.9%. 1000 milliLiter(s) IV Continuous <Continuous>  tamsulosin 0.4 milliGRAM(s) Oral at bedtime    Vital Signs Last 24 Hrs  T(C): 36.3 (17 Aug 2022 04:53), Max: 36.9 (16 Aug 2022 17:05)  T(F): 97.4 (17 Aug 2022 04:53), Max: 98.5 (16 Aug 2022 17:05)  HR: 50 (17 Aug 2022 04:53) (50 - 65)  BP: 114/67 (17 Aug 2022 04:53) (114/67 - 122/74)  BP(mean): --  RR: 18 (17 Aug 2022 04:53) (18 - 19)  SpO2: 96% (17 Aug 2022 04:53) (96% - 99%)    Parameters below as of 16 Aug 2022 17:05  Patient On (Oxygen Delivery Method): room air        PHYSICAL EXAM:  GENERAL: NAD, well-groomed, well-developed  HEENT : Conjuntivae  clear sclerae anicteric  NECK: Supple, No JVD, Normal thyroid  NERVOUS SYSTEM:  Alert oriented   no  focal  deficits;   CHEST/LUNG: Clear    HEART: Regular rate and rhythm; No murmurs, rubs, or gallops  ABDOMEN: Soft, Nontender, Nondistended; Bowel sounds present  EXTREMITIES:  no  edema no  tenderness  SKIN: No rashes   LABS:                        12.7   6.70  )-----------( 308      ( 17 Aug 2022 06:30 )             41.5     08-16    141  |  109<H>  |  11  ----------------------------<  96  4.4   |  29  |  1.09    Ca    8.7      16 Aug 2022 07:06          CAPILLARY BLOOD GLUCOSE          RADIOLOGY & ADDITIONAL TESTS:    Imaging reports  Personally Reviewed:  [x ] YES  [ ] NO    Consultant(s) Notes Reviewed:  [ x] YES  [ ] NO    Care Discussed with Consultants/Other Providers [x ] YES  [ ] NO  tProblem/Plan - 1:  ·  Problem: Urinary tract infection due to ESBL Klebsiella.   ·  Plan: ivf  zosyn. continue  as per  ID    Problem/Plan - 2:  ·  Problem: H/O urinary retention.   ·  Plan: foly  cathater  flomax  urology  consult. appreciated    Problem/Plan - 3:  ·  Problem: Urinary tract infection with hematuria.   ·  Plan: ivf  ab  monitor  h/h.

## 2022-08-17 NOTE — PROGRESS NOTE ADULT - ASSESSMENT
A/P: 62M w/ PMH BPH, indwelling almodovar since 8/2. Presents as a call back for MDR UTI.     -continue almodovar  -continue flomax  -f/u ID. ?change antibiotics as Zosyn is intermediate sensitivity on 8/14 culture  -? TOV before discharge  will discuss with attending, full recs to follow A/P: 62M w/ PMH BPH, indwelling almodovar since 8/2. Presents as a call back for MDR UTI.     -continue almodovar  -continue flomax  -f/u ID. ?change antibiotics as Zosyn is intermediate sensitivity on 8/14 culture  -no TOV before discharge. Dc home with almodovar.  -f/u with private urologist. (pt can also f/u with DEBBIE Payan MD if he wishes)  discussed with urology attending,DEBBIE Payan MD A/P: 62M w/ PMH BPH, indwelling almodovar since 8/2. Presents as a call back for MDR UTI.     -continue almodovra  -continue flomax  -f/u ID. ?change antibiotics as Zosyn is intermediate sensitivity on 8/14 culture  -no TOV before discharge. Dc home with almodovar.  -f/u with private urologist. (pt can also f/u with DEBBIE Payan MD if he wishes)  -will sign off at this point. please call with any questions/concerns  discussed with urology attending,DEBBIE Payan MD

## 2022-08-18 LAB
ANION GAP SERPL CALC-SCNC: 3 MMOL/L — LOW (ref 5–17)
APPEARANCE UR: CLEAR — SIGNIFICANT CHANGE UP
BACTERIA # UR AUTO: ABNORMAL
BILIRUB UR-MCNC: NEGATIVE — SIGNIFICANT CHANGE UP
BUN SERPL-MCNC: 11 MG/DL — SIGNIFICANT CHANGE UP (ref 7–23)
CALCIUM SERPL-MCNC: 8.5 MG/DL — SIGNIFICANT CHANGE UP (ref 8.5–10.1)
CHLORIDE SERPL-SCNC: 107 MMOL/L — SIGNIFICANT CHANGE UP (ref 96–108)
CO2 SERPL-SCNC: 29 MMOL/L — SIGNIFICANT CHANGE UP (ref 22–31)
COLOR SPEC: YELLOW — SIGNIFICANT CHANGE UP
CREAT SERPL-MCNC: 1 MG/DL — SIGNIFICANT CHANGE UP (ref 0.5–1.3)
DIFF PNL FLD: ABNORMAL
EGFR: 85 ML/MIN/1.73M2 — SIGNIFICANT CHANGE UP
EPI CELLS # UR: SIGNIFICANT CHANGE UP
GLUCOSE SERPL-MCNC: 90 MG/DL — SIGNIFICANT CHANGE UP (ref 70–99)
GLUCOSE UR QL: NEGATIVE MG/DL — SIGNIFICANT CHANGE UP
HCT VFR BLD CALC: 40.6 % — SIGNIFICANT CHANGE UP (ref 39–50)
HGB BLD-MCNC: 12.4 G/DL — LOW (ref 13–17)
KETONES UR-MCNC: NEGATIVE — SIGNIFICANT CHANGE UP
LEUKOCYTE ESTERASE UR-ACNC: ABNORMAL
MCHC RBC-ENTMCNC: 25.8 PG — LOW (ref 27–34)
MCHC RBC-ENTMCNC: 30.5 G/DL — LOW (ref 32–36)
MCV RBC AUTO: 84.6 FL — SIGNIFICANT CHANGE UP (ref 80–100)
NITRITE UR-MCNC: NEGATIVE — SIGNIFICANT CHANGE UP
NRBC # BLD: 0 /100 WBCS — SIGNIFICANT CHANGE UP (ref 0–0)
PH UR: 7 — SIGNIFICANT CHANGE UP (ref 5–8)
PLATELET # BLD AUTO: 310 K/UL — SIGNIFICANT CHANGE UP (ref 150–400)
POTASSIUM SERPL-MCNC: 3.7 MMOL/L — SIGNIFICANT CHANGE UP (ref 3.5–5.3)
POTASSIUM SERPL-SCNC: 3.7 MMOL/L — SIGNIFICANT CHANGE UP (ref 3.5–5.3)
PROT UR-MCNC: 15 MG/DL
RBC # BLD: 4.8 M/UL — SIGNIFICANT CHANGE UP (ref 4.2–5.8)
RBC # FLD: 14.9 % — HIGH (ref 10.3–14.5)
RBC CASTS # UR COMP ASSIST: ABNORMAL /HPF (ref 0–4)
SODIUM SERPL-SCNC: 139 MMOL/L — SIGNIFICANT CHANGE UP (ref 135–145)
SP GR SPEC: 1 — LOW (ref 1.01–1.02)
UROBILINOGEN FLD QL: NEGATIVE MG/DL — SIGNIFICANT CHANGE UP
WBC # BLD: 6.97 K/UL — SIGNIFICANT CHANGE UP (ref 3.8–10.5)
WBC # FLD AUTO: 6.97 K/UL — SIGNIFICANT CHANGE UP (ref 3.8–10.5)
WBC UR QL: ABNORMAL

## 2022-08-18 RX ADMIN — MEROPENEM 100 MILLIGRAM(S): 1 INJECTION INTRAVENOUS at 21:59

## 2022-08-18 RX ADMIN — SODIUM CHLORIDE 75 MILLILITER(S): 9 INJECTION INTRAMUSCULAR; INTRAVENOUS; SUBCUTANEOUS at 06:13

## 2022-08-18 RX ADMIN — MEROPENEM 100 MILLIGRAM(S): 1 INJECTION INTRAVENOUS at 06:13

## 2022-08-18 RX ADMIN — FINASTERIDE 5 MILLIGRAM(S): 5 TABLET, FILM COATED ORAL at 12:10

## 2022-08-18 RX ADMIN — TAMSULOSIN HYDROCHLORIDE 0.4 MILLIGRAM(S): 0.4 CAPSULE ORAL at 21:59

## 2022-08-18 RX ADMIN — Medication 1 TABLET(S): at 06:14

## 2022-08-18 RX ADMIN — SODIUM CHLORIDE 75 MILLILITER(S): 9 INJECTION INTRAMUSCULAR; INTRAVENOUS; SUBCUTANEOUS at 21:58

## 2022-08-18 RX ADMIN — Medication 1 TABLET(S): at 17:36

## 2022-08-18 RX ADMIN — MEROPENEM 100 MILLIGRAM(S): 1 INJECTION INTRAVENOUS at 13:22

## 2022-08-18 NOTE — PROGRESS NOTE ADULT - SUBJECTIVE AND OBJECTIVE BOX
INTERVAL HPI/OVERNIGHT EVENTS:        REVIEW OF SYSTEMS:  CONSTITUTIONAL:  no  complaints    NECK: No pain or stiffnes  RESPIRATORY: No SOB   CARDIOVASCULAR: No chest pain, palpitations, dizziness,   GASTROINTESTINAL: No abdominal pain. No nausea, vomiting,   NEUROLOGICAL: No headaches, no  blurry  vision no  dizziness  SKIN: No itching,   MUSCULOSKELETAL: No pain    MEDICATION:  finasteride 5 milliGRAM(s) Oral daily  lactobacillus acidophilus 1 Tablet(s) Oral two times a day  meropenem  IVPB      meropenem  IVPB 1000 milliGRAM(s) IV Intermittent every 8 hours  sodium chloride 0.9%. 1000 milliLiter(s) IV Continuous <Continuous>  tamsulosin 0.4 milliGRAM(s) Oral at bedtime    Vital Signs Last 24 Hrs  T(C): 36.5 (18 Aug 2022 05:46), Max: 36.8 (17 Aug 2022 18:02)  T(F): 97.7 (18 Aug 2022 05:46), Max: 98.2 (17 Aug 2022 18:02)  HR: 52 (18 Aug 2022 05:46) (52 - 98)  BP: 102/63 (18 Aug 2022 05:46) (102/63 - 111/56)  BP(mean): --  RR: 18 (18 Aug 2022 05:46) (18 - 18)  SpO2: 96% (18 Aug 2022 05:46) (96% - 98%)    Parameters below as of 18 Aug 2022 05:46  Patient On (Oxygen Delivery Method): room air        PHYSICAL EXAM:  GENERAL: NAD, well-groomed, well-developed  HEENT : Conjuntivae  clear sclerae anicteric  NECK: Supple, No JVD, Normal thyroid  NERVOUS SYSTEM:  Alert oriented   no  focal  deficits;   CHEST/LUNG: Clear    HEART: Regular rate and rhythm; No murmurs, rubs, or gallops  ABDOMEN: Soft, Nontender, Nondistended; Bowel sounds present  EXTREMITIES:  no  edema no  tenderness  SKIN: No rashes   LABS:                        12.4   6.97  )-----------( 310      ( 18 Aug 2022 07:30 )             40.6     08-17    140  |  108  |  14  ----------------------------<  93  4.0   |  27  |  1.11    Ca    8.7      17 Aug 2022 06:30          CAPILLARY BLOOD GLUCOSE          RADIOLOGY & ADDITIONAL TESTS:    Imaging reports  Personally Reviewed:  [ ] YES  [ ] NO    Consultant(s) Notes Reviewed:  [x YES  [ ] NO    Care Discussed with Consultants/Other Providers [ x] YES  [ ] NO  tProblem/Plan - 1:  ·  Problem: Urinary tract infection due to ESBL Klebsiella.   ·  Plan: ivf  now  on  meropenem  for  more  optimal  sensitivity     Problem/Plan - 2:  ·  Problem: H/O urinary retention.   ·  Plan: foly  cathater  flomax  urology  consult. appreciated    Problem/Plan - 3:  ·  Problem: Urinary tract infection with hematuria.   ·  Plan: ivf  ab  monitor  h/h.

## 2022-08-18 NOTE — PROGRESS NOTE ADULT - SUBJECTIVE AND OBJECTIVE BOX
TMAX - 98.2    On day # 2 Meropenem / s/p Zosyn x 2 days    Vital Signs Last 24 Hrs  T(C): 36.8 (18 Aug 2022 12:06), Max: 36.8 (17 Aug 2022 18:02)  T(F): 98.2 (18 Aug 2022 12:06), Max: 98.2 (17 Aug 2022 18:02)  HR: 60 (18 Aug 2022 12:06) (52 - 98)  BP: 115/63 (18 Aug 2022 12:06) (102/63 - 115/68  RR: 18 (18 Aug 2022 12:06) (18 - 18)  SpO2: 97% (18 Aug 2022 12:06) (96% - 98%)  Parameters below as of 18 Aug 2022 12:06  Patient On (Oxygen Delivery Method): room air  Supplemental O2:  on RA     Awake, alert, no c/o cp, abdominal pain, or back pain but still with discomfort secondary to the indwelling López Catheter.  Appetie is good and he reports drinking plenty of water/ liquids.     PHYSICAL EXAM  General:  63 y/o black male awake, alert, sitting upright in bed now, pleasant and cooperative, in NAD  HEENT: conj pink, sclerae anicteric, PERRLA, no oral lesions noted  Neck: supple, no nodes noted  Heart: RR  Lungs: clear bilaterally  Abdomen: BS+, soft, nontender to palpation, no masses or HS-megaly detected  Back: no CVA or Spinal tenderness noted  Extremities: no edema LE's  Skin: warm, dry, no rash noted  López in place and draining  mostly some get - colored  urine now - 6750 cc output recorded over the prior 24hrs.        I&O's Summary :    17 Aug 2022 07:01  -  18 Aug 2022 07:00  --------------------------------------------------------  IN: 3530 mL / OUT: 6750 mL / NET: -3220 mL    18 Aug 2022 07:01  -  18 Aug 2022 16:41  --------------------------------------------------------  IN: 0 mL / OUT: 1800 mL / NET: -1800 mL      LABS:  CBC Full  -  ( 18 Aug 2022 07:30 )  WBC Count : 6.97 K/uL  RBC Count : 4.80 M/uL  Hemoglobin : 12.4 g/dL  Hematocrit : 40.6 %  Platelet Count - Automated : 310 K/uL  Mean Cell Volume : 84.6 fl  Mean Cell Hemoglobin : 25.8 pg  Mean Cell Hemoglobin Concentration : 30.5 g/dL  Auto Neutrophil # : x  Auto Lymphocyte # : x  Auto Monocyte # : x  Auto Eosinophil # : x  Auto Basophil # : x  Auto Neutrophil % : x  Auto Lymphocyte % : x  Auto Monocyte % : x  Auto Eosinophil % : x  Auto Basophil % : x    08-18    139  |  107  |  11  ----------------------------<  90  3.7   |  29  |  1.00    Ca    8.5      18 Aug 2022 07:30    Sedimentation Rate, Erythrocyte: 4 mm/hr (08-16 @ 07:06)    C-Reactive Protein, Serum (08.16.22 @ 07:06)    C-Reactive Protein, Serum: <3: Test Repeated mg/L    Procalcitonin, Serum (08.16.22 @ 07:06)    Procalcitonin, Serum: 0.03:     Hepatitis C Antibody Test (08.15.22 @ 07:07)    Hepatitis C Virus S/CO Ratio: 0.09 S/CO    Hepatitis C Virus Interpretation: Nonreact: Hepatitis C AB    Lactate, Blood (08.14.22 @ 15:52)    Lactate, Blood: 1.2 mmol/L      MICROBIOLOGY:  Specimen Source: .Blood Blood-Peripheral (08-14 @ 16:55)  Culture Results:   No growth to date. (08-14 @ 16:55)    Specimen Source: .Urine (08-14 @ 16:55)  Culture Results:   50,000 - 99,000 CFU/mL Klebsiella pneumoniae ESBL (08-14 @ 16:55)  Culture Results:   50,000 - 99,000 CFU/mL Klebsiella pneumoniae ESBL (08-14 @ 16:55)    -  Trimethoprim/Sulfamethoxazole: R >2/38    -  Gentamicin: I 8    -  Imipenem: S <=1    -  Levofloxacin: R >4    -  Meropenem: S <=1    -  Nitrofurantoin: R >64 Should not be used to treat pyelonephritis    -  Piperacillin/Tazobactam: I 32    -  Tigecycline: S <=2    -  Tobramycin: R >8    -  Amikacin: S <=16    -  Amoxicillin/Clavulanic Acid: R >16/8    -  Ampicillin: R >16 These ampicillin results predict results for amoxicillin    -  Ampicillin/Sulbactam: R >16/8 Enterobacter, Klebsiella aerogenes, Citrobacter, and Serratia may develop resistance during prolonged therapy (3-4 days)    -  Aztreonam: R >16    -  Cefazolin: R >16 (MIC_CL_COM_ENTERIC_CEFAZU) For uncomplicated UTI with K. pneumoniae, E. coli, or P. mirablis: CEE <=16 is sensitive and CEE >=32 is resistant. This also predicts results for oral agents cefaclor, cefdinir, cefpodoxime, cefprozil, cefuroxime axetil, cephalexin and locarbef for uncomplicated UTI. Note that some isolates may be susceptible to these agents while testing resistant to cefazolin.    -  Cefepime: R >16    -  Ceftriaxone: R >32 Enterobacter, Klebsiella aerogenes, Citrobacter, and Serratia may develop resistance during prolonged therapy    -  Ciprofloxacin: R >2    -  Ertapenem: S <=0.5    Organism Identification: Klebsiella pneumoniae ESBL    Method Type: CEE    Specimen Source: .Blood Blood-Peripheral (08-14 @ 16:15)  Culture Results:   No growth to date. (08-14 @ 16:15)      Flu With COVID-19 By ELADIO (08.14.22 @ 15:52)    SARS-CoV-2 Result: NotDetec: EUA/IVD    Influenza A Result: NotDetec: EUA/IVD    Influenza B Result: NotDetec: EUA/IVD    Specimen Source: Catheterized Catheterized (08-09 @ 10:00)  Culture Results:   >100,000 CFU/ml Klebsiella pneumoniae ESBL (08-09 @ 10:00)  Culture - Urine (08.09.22 @ 10:00)    -  Amikacin: S <=16    -  Amoxicillin/Clavulanic Acid: I 16/8    -  Ampicillin: R >16 These ampicillin results predict results for amoxicillin    -  Ampicillin/Sulbactam: R >16/8 Enterobacter, Klebsiella aerogenes, Citrobacter, and Serratia may develop resistance during prolonged therapy (3-4 days)    -  Aztreonam: R >16    -  Cefazolin: R >16 (MIC_CL_COM_ENTERIC_CEFAZU) For uncomplicated UTI with K. pneumoniae, E. coli, or P. mirablis: CEE <=16 is sensitive and CEE >=32 is resistant. This also predicts results for oral agents cefaclor, cefdinir, cefpodoxime, cefprozil, cefuroxime axetil, cephalexin and locarbef for uncomplicated UTI. Note that some isolates may be susceptible to these agents while testing resistant to cefazolin.    -  Cefepime: R >16    -  Ceftriaxone: R >32 Enterobacter, Klebsiella aerogenes, Citrobacter, and Serratia may develop resistance during prolonged therapy    -  Ciprofloxacin: R >2    -  Ertapenem: S <=0.5    -  Gentamicin: I 8    -  Imipenem: S <=1    -  Levofloxacin: R >4    -  Meropenem: S <=1    -  Nitrofurantoin: R >64 Should not be used to treat pyelonephritis    -  Piperacillin/Tazobactam: S 16    -  Tigecycline: S <=2    -  Tobramycin: R >8    -  Trimethoprim/Sulfamethoxazole: R >2/38    Organism Identification: Klebsiella pneumoniae ESBL    Organism: Klebsiella pneumoniae ESBL    Method Type: CEE    HIV-1/2 Antigen/Antibody Screen by [facility] (08.15.22 @ 07:07)    HIV-1/2 Combo Result: Nonreact:      RADIOLOGY:  < from: US Renal (08.16.22 @ 09:29) >  ACC: [id number] EXAM:  US KIDNEY(S)                        PROCEDURE DATE:  08/16/2022    INTERPRETATION:  CLINICAL INFORMATION: BPH, hematuria, retention  COMPARISON: None available.  TECHNIQUE: Sonography of the kidneys and bladder.  FINDINGS:  Right kidney: 10.6 cm. No renal mass, hydronephrosis or calculi.  Left kidney: 11.7 cm. No renal mass, hydronephrosis or calculi.  Urinary bladder: Decompressed with López  Exceedingly large prostate gland (8.8 x 5.6 x 6.7 cm) for a volume of 1   74 cc  IMPRESSION:  Unremarkable kidneys. No hydronephrosis.  Exceedingly large prostate gland.      Impression:   63 y/o black  male with hx of BPH, s/p Rezum procedure in 2017 and did well until he had an episode of Urinary Retention in 2020 requiring López placement and then removal with a UTI at the time, with a recurrent episode of Urinary Retention requiring López placement on 8/2/22 at [facility]  which was subsequently removed in his Urologist's Office on 8/8/22, but patient again developed Urinary retention and came to the [facility] ER on 8/9/22 where a López was replaced and patient was rx'ed with Cefdinir empirically for a presumed UTI, now admitted via the ER to [facility] on 8/14/22 after being called by the ER to return due to Urine Cx results showing growth of an ESBL Kleb pneumoniae in the Urine Cx.  Patient reports having an elevated PSA and previously underwent w/u with an MRI which was reported as normal.  In the ER patient was found to be afebrile and WBC's were WNL and he was noted to have visible hematuria.  Lactate was WNL and 2 Blood Cx's and a new Urine Cx were obtained and he was begun on rx with Zosyn.  Now 2 out of 2 Blood Cx's from 8/14/22 are negative thus far and repeat Urine Cx from 8/14/22 is now reported with 50,000 - 99,000 cfu of ESBL Kleb pneumo with sensitivities pending.  Patient remains afebrile and Renal Sono done this AM is WNL with no evidence of hydro, mass, or calculi but reported with an exceedingly large prostate. ESR, CRP, and Procalcitonin are WNL. Noted now with marked clearing of the initial gross hematuria.  Repeat Urine Cx from 8/14/22 now finalized with growth of ESBL Kleb pneumo which is now reported as only Intermediately sensitive to the Zosyn.    Suggestions:  Will therefore continue present ab rx with  Meropenem for rx of  the ESBL Kleb Pneumo UTI  and continue Bacid rx while on ab's.  Follow-up temps and labs closely. Will repeat u/a with micro and Urine Cx again today to re-evaluate.  Patient will likely require a minimum of 7 days of IV Ab rx for his severe Hemorrhagic Cystitis secondary to ESBL Kleb pneumo.   He will also require further Urologic intervention for his significant BPH with secondary Urinary retention once his infection is cleared.  Discussed in detail  again with patient  and his wife at the bedside.

## 2022-08-19 LAB
ANION GAP SERPL CALC-SCNC: 1 MMOL/L — LOW (ref 5–17)
BUN SERPL-MCNC: 10 MG/DL — SIGNIFICANT CHANGE UP (ref 7–23)
CALCIUM SERPL-MCNC: 8.7 MG/DL — SIGNIFICANT CHANGE UP (ref 8.5–10.1)
CHLORIDE SERPL-SCNC: 109 MMOL/L — HIGH (ref 96–108)
CO2 SERPL-SCNC: 31 MMOL/L — SIGNIFICANT CHANGE UP (ref 22–31)
CREAT SERPL-MCNC: 0.97 MG/DL — SIGNIFICANT CHANGE UP (ref 0.5–1.3)
EGFR: 88 ML/MIN/1.73M2 — SIGNIFICANT CHANGE UP
GLUCOSE SERPL-MCNC: 91 MG/DL — SIGNIFICANT CHANGE UP (ref 70–99)
HCT VFR BLD CALC: 39.6 % — SIGNIFICANT CHANGE UP (ref 39–50)
HGB BLD-MCNC: 12.3 G/DL — LOW (ref 13–17)
MCHC RBC-ENTMCNC: 26.1 PG — LOW (ref 27–34)
MCHC RBC-ENTMCNC: 31.1 G/DL — LOW (ref 32–36)
MCV RBC AUTO: 84.1 FL — SIGNIFICANT CHANGE UP (ref 80–100)
NRBC # BLD: 0 /100 WBCS — SIGNIFICANT CHANGE UP (ref 0–0)
PLATELET # BLD AUTO: 297 K/UL — SIGNIFICANT CHANGE UP (ref 150–400)
POTASSIUM SERPL-MCNC: 3.9 MMOL/L — SIGNIFICANT CHANGE UP (ref 3.5–5.3)
POTASSIUM SERPL-SCNC: 3.9 MMOL/L — SIGNIFICANT CHANGE UP (ref 3.5–5.3)
RBC # BLD: 4.71 M/UL — SIGNIFICANT CHANGE UP (ref 4.2–5.8)
RBC # FLD: 15 % — HIGH (ref 10.3–14.5)
SODIUM SERPL-SCNC: 141 MMOL/L — SIGNIFICANT CHANGE UP (ref 135–145)
WBC # BLD: 7.05 K/UL — SIGNIFICANT CHANGE UP (ref 3.8–10.5)
WBC # FLD AUTO: 7.05 K/UL — SIGNIFICANT CHANGE UP (ref 3.8–10.5)

## 2022-08-19 PROCEDURE — 76942 ECHO GUIDE FOR BIOPSY: CPT | Mod: 26

## 2022-08-19 RX ADMIN — MEROPENEM 100 MILLIGRAM(S): 1 INJECTION INTRAVENOUS at 05:17

## 2022-08-19 RX ADMIN — FINASTERIDE 5 MILLIGRAM(S): 5 TABLET, FILM COATED ORAL at 14:05

## 2022-08-19 RX ADMIN — Medication 1 TABLET(S): at 18:04

## 2022-08-19 RX ADMIN — MEROPENEM 100 MILLIGRAM(S): 1 INJECTION INTRAVENOUS at 22:20

## 2022-08-19 RX ADMIN — MEROPENEM 100 MILLIGRAM(S): 1 INJECTION INTRAVENOUS at 14:32

## 2022-08-19 RX ADMIN — TAMSULOSIN HYDROCHLORIDE 0.4 MILLIGRAM(S): 0.4 CAPSULE ORAL at 22:20

## 2022-08-19 RX ADMIN — Medication 1 TABLET(S): at 05:21

## 2022-08-19 NOTE — PROGRESS NOTE ADULT - SUBJECTIVE AND OBJECTIVE BOX
INTERVAL HPI/OVERNIGHT EVENTS:      pink colored  urine  in  almodovar    REVIEW OF SYSTEMS:  CONSTITUTIONAL:  no  complaints    NECK: No pain or stiffnes  RESPIRATORY: No SOB   CARDIOVASCULAR: No chest pain, palpitations, dizziness,   GASTROINTESTINAL: No abdominal pain. No nausea, vomiting,   NEUROLOGICAL: No headaches, no  blurry  vision no  dizziness  SKIN: No itching,   MUSCULOSKELETAL: No pain    MEDICATION:  finasteride 5 milliGRAM(s) Oral daily  lactobacillus acidophilus 1 Tablet(s) Oral two times a day  meropenem  IVPB      meropenem  IVPB 1000 milliGRAM(s) IV Intermittent every 8 hours  sodium chloride 0.9%. 1000 milliLiter(s) IV Continuous <Continuous>  tamsulosin 0.4 milliGRAM(s) Oral at bedtime    Vital Signs Last 24 Hrs  T(C): 36.4 (19 Aug 2022 05:12), Max: 36.8 (18 Aug 2022 12:06)  T(F): 97.6 (19 Aug 2022 05:12), Max: 98.3 (18 Aug 2022 18:46)  HR: 48 (19 Aug 2022 05:12) (48 - 65)  BP: 120/74 (19 Aug 2022 05:12) (115/63 - 120/74)  BP(mean): --  RR: 18 (19 Aug 2022 05:12) (18 - 18)  SpO2: 97% (19 Aug 2022 05:12) (95% - 97%)    Parameters below as of 19 Aug 2022 05:12  Patient On (Oxygen Delivery Method): room air        PHYSICAL EXAM:  GENERAL: NAD, well-groomed, well-developed  HEENT : Conjuntivae  clear sclerae anicteric  NECK: Supple, No JVD, Normal thyroid  NERVOUS SYSTEM:  Alert oriented   no  focal  deficits;   CHEST/LUNG: Clear    HEART: Regular rate and rhythm; No murmurs, rubs, or gallops  ABDOMEN: Soft, Nontender, Nondistended; Bowel sounds present  EXTREMITIES:  no  edema no  tenderness  SKIN: No rashes   LABS:                        12.3   7.05  )-----------( 297      ( 19 Aug 2022 07:55 )             39.6     08-18    139  |  107  |  11  ----------------------------<  90  3.7   |  29  |  1.00    Ca    8.5      18 Aug 2022 07:30        Urinalysis Basic - ( 18 Aug 2022 16:10 )    Color: Yellow / Appearance: Clear / S.005 / pH: x  Gluc: x / Ketone: Negative  / Bili: Negative / Urobili: Negative mg/dL   Blood: x / Protein: 15 mg/dL / Nitrite: Negative   Leuk Esterase: Moderate / RBC: 25-50 /HPF / WBC 6-10   Sq Epi: x / Non Sq Epi: Occasional / Bacteria: Few      CAPILLARY BLOOD GLUCOSE          RADIOLOGY & ADDITIONAL TESTS:    Imaging reports  Personally Reviewed:  [ ] YES  [ ] NO    Consultant(s) Notes Reviewed:  [x ] YES  [ ] NO    Care Discussed with Consultants/Other Providers [ x] YES  [ ] NO  tProblem/Plan - 1:  ·  Problem: Urinary tract infection due to ESBL Klebsiella.   ·  Plan: ivf  now  on  meropenem  for  more  optimal  sensitivity     Problem/Plan - 2:  ·  Problem: H/O urinary retention.   ·  Plan: foly  cathater  flomax  urology  consult. appreciated    Problem/Plan - 3:  ·  Problem: Urinary tract infection with hematuria.   ·  Plan: ivf  ab  monitor  h/h.

## 2022-08-19 NOTE — PROGRESS NOTE ADULT - SUBJECTIVE AND OBJECTIVE BOX
TMAX - 98.3    On day # 3 Meropenem / s/p Zosyn x 2 days    Vital Signs Last 24 Hrs  T(C): 36.9 (19 Aug 2022 16:35), Max: 36.9 (19 Aug 2022 16:35)  T(F): 98.5 (19 Aug 2022 16:35), Max: 98.5 (19 Aug 2022 16:35)  HR: 62 (19 Aug 2022 16:35) (48 - 65)  BP: 108/60 (19 Aug 2022 16:35) (105/65 - 120/74)  RR: 18 (19 Aug 2022 16:35) (18 - 18)  SpO2: 97% (19 Aug 2022 16:35) (95% - 97%)  Parameters below as of 19 Aug 2022 16:35  Patient On (Oxygen Delivery Method): room air  Supplemental O2:  on RA     Awake, alert, no c/o cp, abdominal pain, or back/ flank pain although still with discomfort from the indwelling López.  Appetite is good and he is drinking  a lot of fluids.  O2 Sat's remain between 95- 97% on RA    PHYSICAL EXAM  General:  63 y/o black male awake, alert, sitting upright in bed now, pleasant and cooperative, in NAD  HEENT: conj pink, sclerae anicteric, PERRLA, no oral lesions noted  Neck: supple, no nodes noted  Heart: RR  Lungs: clear bilaterally  Abdomen: BS+, soft, nontender to palpation, no masses or HS-megaly detected  Back: no CVA or Spinal tenderness noted  Extremities: no edema LE's  Skin: warm, dry, no rash noted  López in place and draining  mostly some get - colored  urine now - 3.600 cc output recorded over the prior 24hrs.       I&O's Summary :    18 Aug 2022 07:01  -  19 Aug 2022 07:00  --------------------------------------------------------  IN: 1000 mL / OUT: 5400 mL / NET: -4400 mL    19 Aug 2022 07:01  -  19 Aug 2022 18:43  --------------------------------------------------------  IN: 0 mL / OUT: 2400 mL / NET: -2400 mL      LABS:  CBC Full  -  ( 19 Aug 2022 07:55 )  WBC Count : 7.05 K/uL  RBC Count : 4.71 M/uL  Hemoglobin : 12.3 g/dL  Hematocrit : 39.6 %  Platelet Count - Automated : 297 K/uL  Mean Cell Volume : 84.1 fl  Mean Cell Hemoglobin : 26.1 pg  Mean Cell Hemoglobin Concentration : 31.1 g/dL  Auto Neutrophil # : x  Auto Lymphocyte # : x  Auto Monocyte # : x  Auto Eosinophil # : x  Auto Basophil # : x  Auto Neutrophil % : x  Auto Lymphocyte % : x  Auto Monocyte % : x  Auto Eosinophil % : x  Auto Basophil % : x    08    141  |  109<H>  |  10  ----------------------------<  91  3.9   |  31  |  0.97    Ca    8.7      19 Aug 2022 07:55    Urinalysis Basic - ( 18 Aug 2022 16:10 )  Color: Yellow / Appearance: Clear / S.005 / pH: x  Gluc: x / Ketone: Negative  / Bili: Negative / Urobili: Negative mg/dL   Blood: x / Protein: 15 mg/dL / Nitrite: Negative   Leuk Esterase: Moderate / RBC: 25-50 /HPF / WBC 6-10   Sq Epi: x / Non Sq Epi: Occasional / Bacteria: Few    Sedimentation Rate, Erythrocyte: 4 mm/hr ( @ 07:06)    C-Reactive Protein, Serum (22 @ 07:06)    C-Reactive Protein, Serum: <3: Test Repeated mg/L    Procalcitonin, Serum (22 @ 07:06)    Procalcitonin, Serum: 0.03:     Hepatitis C Antibody Test (08.15.22 @ 07:07)    Hepatitis C Virus S/CO Ratio: 0.09 S/CO    Hepatitis C Virus Interpretation: Nonreact: Hepatitis C AB    Lactate, Blood (22 @ 15:52)    Lactate, Blood: 1.2 mmol/L      MICROBIOLOGY:  Specimen Source: .Blood Blood-Peripheral ( @ 16:55)  Culture Results:   No growth to date. ( @ 16:55)    Specimen Source: .Urine ( @ 16:55)  Culture Results:   50,000 - 99,000 CFU/mL Klebsiella pneumoniae ESBL ( @ 16:55)    -  Trimethoprim/Sulfamethoxazole: R >2/38    -  Nitrofurantoin: R >64 Should not be used to treat pyelonephritis    -  Piperacillin/Tazobactam: I 32    -  Tigecycline: S <=2    -  Tobramycin: R >8    -  Cefepime: R >16    -  Ceftriaxone: R >32 Enterobacter, Klebsiella aerogenes, Citrobacter, and Serratia may develop resistance during prolonged therapy    -  Ciprofloxacin: R >2    -  Ertapenem: S <=0.5    -  Gentamicin: I 8    -  Imipenem: S <=1    -  Levofloxacin: R >4    -  Meropenem: S <=1    -  Amikacin: S <=16    -  Amoxicillin/Clavulanic Acid: R >16/8    -  Ampicillin: R >16 These ampicillin results predict results for amoxicillin    -  Ampicillin/Sulbactam: R >16/8 Enterobacter, Klebsiella aerogenes, Citrobacter, and Serratia may develop resistance during prolonged therapy (3-4 days)    -  Aztreonam: R >16    -  Cefazolin: R >16 (MIC_CL_COM_ENTERIC_CEFAZU) For uncomplicated UTI with K. pneumoniae, E. coli, or P. mirablis: CEE <=16 is sensitive and CEE >=32 is resistant. This also predicts results for oral agents cefaclor, cefdinir, cefpodoxime, cefprozil, cefuroxime axetil, cephalexin and locarbef for uncomplicated UTI. Note that some isolates may be susceptible to these agents while testing resistant to cefazolin.    Organism Identification: Klebsiella pneumoniae ESBL    Organism: Klebsiella pneumoniae ESBL    Method Type: CEE    Specimen Source: .Blood Blood-Peripheral ( @ 16:15)  Culture Results:   No growth to date. ( @ 16:15)        Flu With COVID-19 By ELADIO (22 @ 15:52)    SARS-CoV-2 Result: NotDetec: EUA/IVD    Influenza A Result: NotDetec: EUA/IVD    Influenza B Result: NotDetec: EUA/IVD    Specimen Source: Catheterized Catheterized ( @ 10:00)  Culture Results:   >100,000 CFU/ml Klebsiella pneumoniae ESBL ( @ 10:00)  Culture - Urine (22 @ 10:00)    -  Amikacin: S <=16    -  Amoxicillin/Clavulanic Acid: I 16/8    -  Ampicillin: R >16 These ampicillin results predict results for amoxicillin    -  Ampicillin/Sulbactam: R >16/8 Enterobacter, Klebsiella aerogenes, Citrobacter, and Serratia may develop resistance during prolonged therapy (3-4 days)    -  Aztreonam: R >16    -  Cefazolin: R >16 (MIC_CL_COM_ENTERIC_CEFAZU) For uncomplicated UTI with K. pneumoniae, E. coli, or P. mirablis: CEE <=16 is sensitive and CEE >=32 is resistant. This also predicts results for oral agents cefaclor, cefdinir, cefpodoxime, cefprozil, cefuroxime axetil, cephalexin and locarbef for uncomplicated UTI. Note that some isolates may be susceptible to these agents while testing resistant to cefazolin.    -  Cefepime: R >16    -  Ceftriaxone: R >32 Enterobacter, Klebsiella aerogenes, Citrobacter, and Serratia may develop resistance during prolonged therapy    -  Ciprofloxacin: R >2    -  Ertapenem: S <=0.5    -  Gentamicin: I 8    -  Imipenem: S <=1    -  Levofloxacin: R >4    -  Meropenem: S <=1    -  Nitrofurantoin: R >64 Should not be used to treat pyelonephritis    -  Piperacillin/Tazobactam: S 16    -  Tigecycline: S <=2    -  Tobramycin: R >8    -  Trimethoprim/Sulfamethoxazole: R >2/38    Organism Identification: Klebsiella pneumoniae ESBL    Organism: Klebsiella pneumoniae ESBL    Method Type: CEE    HIV-1/2 Antigen/Antibody Screen by [facility]IA (08.15.22 @ 07:07)    HIV-1/2 Combo Result: Nonreact:      RADIOLOGY:  < from: US Renal (22 @ 09:29) >  ACC: [id number] EXAM:  US KIDNEY(S)                        PROCEDURE DATE:  2022    INTERPRETATION:  CLINICAL INFORMATION: BPH, hematuria, retention  COMPARISON: None available.  TECHNIQUE: Sonography of the kidneys and bladder.  FINDINGS:  Right kidney: 10.6 cm. No renal mass, hydronephrosis or calculi.  Left kidney: 11.7 cm. No renal mass, hydronephrosis or calculi.  Urinary bladder: Decompressed with López  Exceedingly large prostate gland (8.8 x 5.6 x 6.7 cm) for a volume of 1   74 cc  IMPRESSION:  Unremarkable kidneys. No hydronephrosis.  Exceedingly large prostate gland.      Impression:   63 y/o black  male with hx of BPH, s/p Rezum procedure in  and did well until he had an episode of Urinary Retention in  requiring López placement and then removal with a UTI at the time, with a recurrent episode of Urinary Retention requiring López placement on 22 at [facility]  which was subsequently removed in his Urologist's Office on 22, but patient again developed Urinary retention and came to the [facility] ER on 22 where a López was replaced and patient was rx'ed with Cefdinir empirically for a presumed UTI, now admitted via the ER to [facility] on 22 after being called by the ER to return due to Urine Cx results showing growth of an ESBL Kleb pneumoniae in the Urine Cx.  Patient reports having an elevated PSA and previously underwent w/u with an MRI which was reported as normal.  In the ER patient was found to be afebrile and WBC's were WNL and he was noted to have visible hematuria.  Lactate was WNL and 2 Blood Cx's and a new Urine Cx were obtained and he was begun on rx with Zosyn.  Now 2 out of 2 Blood Cx's from 22 are negative thus far and repeat Urine Cx from 22 is now reported with 50,000 - 99,000 cfu of ESBL Kleb pneumo with sensitivities pending.  Patient remains afebrile and Renal Sono done this AM is WNL with no evidence of hydro, mass, or calculi but reported with an exceedingly large prostate. ESR, CRP, and Procalcitonin are WNL. Noted now with marked clearing of the initial gross hematuria.  Repeat Urine Cx from 22 now finalized with growth of ESBL Kleb pneumo which is now reported as only Intermediately sensitive to the Zosyn.  Repeat U/A  noted with 25-50 RBC's,  6-10WBC's, and LE Moderate with repeat Urine Cx pending.    Suggestions:  Will therefore continue present ab rx with  Meropenem for rx of  the ESBL Kleb Pneumo UTI and continue Bacid rx while on ab's.  Follow-up temps and labs closely. Follow-up Urine Cx results.  Patient will likely require a minimum of 7 days of IV Ab rx for his severe Hemorrhagic Cystitis secondary to ESBL Kleb pneumo.   He will also require further Urologic intervention for his significant BPH with secondary Urinary retention once his infection is cleared.  Discussed in detail  again with patient at the bedside.

## 2022-08-20 LAB
CULTURE RESULTS: NO GROWTH — SIGNIFICANT CHANGE UP
CULTURE RESULTS: SIGNIFICANT CHANGE UP
CULTURE RESULTS: SIGNIFICANT CHANGE UP
SPECIMEN SOURCE: SIGNIFICANT CHANGE UP

## 2022-08-20 RX ADMIN — MEROPENEM 100 MILLIGRAM(S): 1 INJECTION INTRAVENOUS at 05:47

## 2022-08-20 RX ADMIN — MEROPENEM 100 MILLIGRAM(S): 1 INJECTION INTRAVENOUS at 13:07

## 2022-08-20 RX ADMIN — MEROPENEM 100 MILLIGRAM(S): 1 INJECTION INTRAVENOUS at 22:43

## 2022-08-20 RX ADMIN — FINASTERIDE 5 MILLIGRAM(S): 5 TABLET, FILM COATED ORAL at 11:10

## 2022-08-20 RX ADMIN — Medication 1 TABLET(S): at 05:48

## 2022-08-20 RX ADMIN — Medication 1 TABLET(S): at 17:01

## 2022-08-20 RX ADMIN — TAMSULOSIN HYDROCHLORIDE 0.4 MILLIGRAM(S): 0.4 CAPSULE ORAL at 22:40

## 2022-08-20 NOTE — PROGRESS NOTE ADULT - SUBJECTIVE AND OBJECTIVE BOX
INTERVAL HPI/OVERNIGHT EVENTS:    clear  urine  in  almodovar    REVIEW OF SYSTEMS:  CONSTITUTIONAL: feels well  no  complaints    NECK: No pain or stiffnes  RESPIRATORY: No SOB   CARDIOVASCULAR: No chest pain, palpitations, dizziness,   GASTROINTESTINAL: No abdominal pain. No nausea, vomiting,   NEUROLOGICAL: No headaches, no  blurry  vision no  dizziness  SKIN: No itching,   MUSCULOSKELETAL: No pain    MEDICATION:  finasteride 5 milliGRAM(s) Oral daily  lactobacillus acidophilus 1 Tablet(s) Oral two times a day  meropenem  IVPB      meropenem  IVPB 1000 milliGRAM(s) IV Intermittent every 8 hours  sodium chloride 0.9%. 1000 milliLiter(s) IV Continuous <Continuous>  tamsulosin 0.4 milliGRAM(s) Oral at bedtime    Vital Signs Last 24 Hrs  T(C): 36.7 (20 Aug 2022 05:29), Max: 36.9 (19 Aug 2022 16:35)  T(F): 98 (20 Aug 2022 05:29), Max: 98.5 (19 Aug 2022 16:35)  HR: 49 (20 Aug 2022 05:29) (49 - 62)  BP: 111/68 (20 Aug 2022 05:29) (104/66 - 111/68)  BP(mean): --  RR: 18 (20 Aug 2022 05:29) (18 - 18)  SpO2: 95% (20 Aug 2022 05:29) (95% - 97%)    Parameters below as of 20 Aug 2022 05:29  Patient On (Oxygen Delivery Method): room air        PHYSICAL EXAM:  GENERAL: NAD, well-groomed, well-developed  HEENT : Conjuntivae  clear sclerae anicteric  NECK: Supple, No JVD, Normal thyroid  NERVOUS SYSTEM:  Alert oriented   no  focal  deficits;   CHEST/LUNG: Clear    HEART: Regular rate and rhythm; No murmurs, rubs, or gallops  ABDOMEN: Soft, Nontender, Nondistended; Bowel sounds present  EXTREMITIES:  no  edema no  tenderness  SKIN: No rashes   LABS:                        12.3   7.05  )-----------( 297      ( 19 Aug 2022 07:55 )             39.6     08-19    141  |  109<H>  |  10  ----------------------------<  91  3.9   |  31  |  0.97    Ca    8.7      19 Aug 2022 07:55        Urinalysis Basic - ( 18 Aug 2022 16:10 )    Color: Yellow / Appearance: Clear / S.005 / pH: x  Gluc: x / Ketone: Negative  / Bili: Negative / Urobili: Negative mg/dL   Blood: x / Protein: 15 mg/dL / Nitrite: Negative   Leuk Esterase: Moderate / RBC: 25-50 /HPF / WBC 6-10   Sq Epi: x / Non Sq Epi: Occasional / Bacteria: Few      CAPILLARY BLOOD GLUCOSE          RADIOLOGY & ADDITIONAL TESTS:    Imaging reports  Personally Reviewed:  [ ] YES  [ ] NO    Consultant(s) Notes Reviewed:  [ x] YES  [ ] NO    Care Discussed with Consultants/Other Providers [x ] YES  [ ] NO  tProblem/Plan - 1:  ·  Problem: Urinary tract infection due to ESBL Klebsiella.   ·  Plan: ivf  now  on  meropenem  for  more  optimal  sensitivity     Problem/Plan - 2:  ·  Problem: H/O urinary retention.   ·  Plan: foly  cathater  flomax  urology  consult. appreciated    Problem/Plan - 3:  ·  Problem: Urinary tract infection with hematuria.   ·  Plan: ivf  ab  monitor  h/h.

## 2022-08-20 NOTE — PROGRESS NOTE ADULT - SUBJECTIVE AND OBJECTIVE BOX
TMAX - 98.5    On day # 4 Meropenem / s/p Zosyn x 2 days    Vital Signs Last 24 Hrs  T(C): 36.8 (20 Aug 2022 10:48), Max: 36.8 (19 Aug 2022 23:34)  T(F): 98.2 (20 Aug 2022 10:48), Max: 98.3 (19 Aug 2022 23:34)  HR: 76 (20 Aug 2022 10:48) (49 - 76)  BP: 102/63 (20 Aug 2022 10:48) (102/63 - 111/68)  RR: 18 (20 Aug 2022 10:48) (18 - 18)  SpO2: 95% (20 Aug 2022 10:48) (95% - 96%)  Parameters below as of 20 Aug 2022 10:48  Patient On (Oxygen Delivery Method): room air  Supplemental O2:  on RA    Awake, alert, no c/o abdominal or back / flank pain but still with some discomfort secondary to the indwelling López.  O2 Sat's remain between 95 -97% on RA.  Appetite is good and he is drinking a lot of liquids.    PHYSICAL EXAM   General:  61 y/o black male awake, alert, sitting upright in bed now, pleasant and cooperative, in NAD  HEENT: conj pink, sclerae anicteric, PERRLA, no oral lesions noted  Neck: supple, no nodes noted  Heart: RR  Lungs: clear bilaterally  Abdomen: BS+, soft, nontender to palpation, no masses or HS-megaly detected  Back: no CVA or Spinal tenderness noted  Extremities: no edema LE's  Skin: warm, dry, no rash noted  López in place and draining  lighter get - colored  urine now - 4900 cc output recorded over the prior 24hrs.        I&O's Summary :    19 Aug 2022 07:01  -  20 Aug 2022 07:00  --------------------------------------------------------  IN: 0 mL / OUT: 4900 mL / NET: -4900 mL    20 Aug 2022 07:01  -  20 Aug 2022 16:43  --------------------------------------------------------  IN: 0 mL / OUT: 1500 mL / NET: -1500 mL      LABS:  CBC Full  -  ( 19 Aug 2022 07:55 )  WBC Count : 7.05 K/uL  RBC Count : 4.71 M/uL  Hemoglobin : 12.3 g/dL  Hematocrit : 39.6 %  Platelet Count - Automated : 297 K/uL  Mean Cell Volume : 84.1 fl  Mean Cell Hemoglobin : 26.1 pg  Mean Cell Hemoglobin Concentration : 31.1 g/dL  Auto Neutrophil # : x  Auto Lymphocyte # : x  Auto Monocyte # : x  Auto Eosinophil # : x  Auto Basophil # : x  Auto Neutrophil % : x  Auto Lymphocyte % : x  Auto Monocyte % : x  Auto Eosinophil % : x  Auto Basophil % : x    08-19    141  |  109<H>  |  10  ----------------------------<  91  3.9   |  31  |  0.97    Ca    8.7      19 Aug 2022 07:55    Sedimentation Rate, Erythrocyte: 4 mm/hr (08-16 @ 07:06)    C-Reactive Protein, Serum (08.16.22 @ 07:06)    C-Reactive Protein, Serum: <3: Test Repeated mg/L    Procalcitonin, Serum (08.16.22 @ 07:06)    Procalcitonin, Serum: 0.03:     Hepatitis C Antibody Test (08.15.22 @ 07:07)    Hepatitis C Virus S/CO Ratio: 0.09 S/CO    Hepatitis C Virus Interpretation: Nonreact: Hepatitis C AB    Lactate, Blood (08.14.22 @ 15:52)    Lactate, Blood: 1.2 mmol/L      MICROBIOLOGY:  Specimen Source: Catheterized Catheterized (08-18 @ 16:10)  Culture Results:   No growth (08-18 @ 16:10)    Specimen Source: .Blood Blood-Peripheral (08-14 @ 16:55)  Culture Results:   No Growth Final (08-14 @ 16:55)    Specimen Source: .Urine (08-14 @ 16:55)  Culture Results:   50,000 - 99,000 CFU/mL Klebsiella pneumoniae ESBL (08-14 @ 16:55)    -  Trimethoprim/Sulfamethoxazole: R >2/38    -  Tigecycline: S <=2    -  Tobramycin: R >8    -  Levofloxacin: R >4    -  Meropenem: S <=1    -  Nitrofurantoin: R >64 Should not be used to treat pyelonephritis    -  Piperacillin/Tazobactam: I 32    -  Aztreonam: R >16    -  Cefazolin: R >16 (MIC_CL_COM_ENTERIC_CEFAZU) For uncomplicated UTI with K. pneumoniae, E. coli, or P. mirablis: CEE <=16 is sensitive and CEE >=32 is resistant. This also predicts results for oral agents cefaclor, cefdinir, cefpodoxime, cefprozil, cefuroxime axetil, cephalexin and locarbef for uncomplicated UTI. Note that some isolates may be susceptible to these agents while testing resistant to cefazolin.    -  Cefepime: R >16    -  Ceftriaxone: R >32 Enterobacter, Klebsiella aerogenes, Citrobacter, and Serratia may develop resistance during prolonged therapy    -  Ciprofloxacin: R >2    -  Ertapenem: S <=0.5    -  Gentamicin: I 8    -  Imipenem: S <=1    -  Amikacin: S <=16    -  Amoxicillin/Clavulanic Acid: R >16/8    -  Ampicillin: R >16 These ampicillin results predict results for amoxicillin    -  Ampicillin/Sulbactam: R >16/8 Enterobacter, Klebsiella aerogenes, Citrobacter, and Serratia may develop resistance during prolonged therapy (3-4 days)    Organism: Klebsiella pneumoniae ESBL    Method Type: CEE    Specimen Source: .Blood Blood-Peripheral (08-14 @ 16:15)  Culture Results:   No Growth Final (08-14 @ 16:15)      Flu With COVID-19 By ELADIO (08.14.22 @ 15:52)    SARS-CoV-2 Result: NotDetec: EUA/IVD    Influenza A Result: NotDetec: EUA/IVD    Influenza B Result: NotDetec: EUA/IVD    Specimen Source: Catheterized Catheterized (08-09 @ 10:00)  Culture Results:   >100,000 CFU/ml Klebsiella pneumoniae ESBL (08-09 @ 10:00)  Culture - Urine (08.09.22 @ 10:00)    -  Amikacin: S <=16    -  Amoxicillin/Clavulanic Acid: I 16/8    -  Ampicillin: R >16 These ampicillin results predict results for amoxicillin    -  Ampicillin/Sulbactam: R >16/8 Enterobacter, Klebsiella aerogenes, Citrobacter, and Serratia may develop resistance during prolonged therapy (3-4 days)    -  Aztreonam: R >16    -  Cefazolin: R >16 (MIC_CL_COM_[email or web address]) For uncomplicated UTI with K. pneumoniae, E. coli, or P. mirablis: CEE <=16 is sensitive and CEE >=32 is resistant. This also predicts results for oral agents cefaclor, cefdinir, cefpodoxime, cefprozil, cefuroxime axetil, cephalexin and locarbef for uncomplicated UTI. Note that some isolates may be susceptible to these agents while testing resistant to cefazolin.    -  Cefepime: R >16    -  Ceftriaxone: R >32 Enterobacter, Klebsiella aerogenes, Citrobacter, and Serratia may develop resistance during prolonged therapy    -  Ciprofloxacin: R >2    -  Ertapenem: S <=0.5    -  Gentamicin: I 8    -  Imipenem: S <=1    -  Levofloxacin: R >4    -  Meropenem: S <=1    -  Nitrofurantoin: R >64 Should not be used to treat pyelonephritis    -  Piperacillin/Tazobactam: S 16    -  Tigecycline: S <=2    -  Tobramycin: R >8    -  Trimethoprim/Sulfamethoxazole: R >2/38    Organism Identification: Klebsiella pneumoniae ESBL    Organism: Klebsiella pneumoniae ESBL    Method Type: [facility]    HIV-1/2 Antigen/Antibody Screen by [facility] (08.15.22 @ 07:07)    HIV-1/2 Combo Result: Nonreact:      RADIOLOGY:  < from: US Renal (08.16.22 @ 09:29) >  ACC: [id number] EXAM:  US KIDNEY(S)                        PROCEDURE DATE:  08/16/2022    INTERPRETATION:  CLINICAL INFORMATION: BPH, hematuria, retention  COMPARISON: None available.  TECHNIQUE: Sonography of the kidneys and bladder.  FINDINGS:  Right kidney: 10.6 cm. No renal mass, hydronephrosis or calculi.  Left kidney: 11.7 cm. No renal mass, hydronephrosis or calculi.  Urinary bladder: Decompressed with López  Exceedingly large prostate gland (8.8 x 5.6 x 6.7 cm) for a volume of 1   74 cc  IMPRESSION:  Unremarkable kidneys. No hydronephrosis.  Exceedingly large prostate gland.      Impression:  61 y/o black  male with hx of BPH, s/p Rezum procedure in 2017 and did well until he had an episode of Urinary Retention in 2020 requiring López placement and then removal with a UTI at the time, with a recurrent episode of Urinary Retention requiring López placement on 8/2/22 at [facility]  which was subsequently removed in his Urologist's Office on 8/8/22, but patient again developed Urinary retention and came to the [facility] ER on 8/9/22 where a López was replaced and patient was rx'ed with Cefdinir empirically for a presumed UTI, now admitted via the ER to [facility] on 8/14/22 after being called by the ER to return due to Urine Cx results showing growth of an ESBL Kleb pneumoniae in the Urine Cx.  Patient reports having an elevated PSA and previously underwent w/u with an MRI which was reported as normal.  In the ER patient was found to be afebrile and WBC's were WNL and he was noted to have visible hematuria.  Lactate was WNL and 2 Blood Cx's and a new Urine Cx were obtained and he was begun on rx with Zosyn.  Now 2 out of 2 Blood Cx's from 8/14/22 are negative thus far and repeat Urine Cx from 8/14/22 is now reported with 50,000 - 99,000 cfu of ESBL Kleb pneumo with sensitivities pending.  Patient remains afebrile and Renal Sono done this AM is WNL with no evidence of hydro, mass, or calculi but reported with an exceedingly large prostate. ESR, CRP, and Procalcitonin are WNL. Noted now with marked clearing of the initial gross hematuria.  Repeat Urine Cx from 8/14/22 now finalized with growth of ESBL Kleb pneumo which is now reported as only Intermediately sensitive to the Zosyn.  Repeat U/A  noted with 25-50 RBC's,  6-10WBC's, and LE Moderate with repeat Urine Cx from 8/18/22  now reported as Negative.    Suggestions:  Will therefore continue present ab rx with  Meropenem for rx of  the ESBL Kleb Pneumo UTI and continue Bacid rx while on ab's.  Follow-up temps and labs closely.  Will plan to complete a few more days of IV Ab rx  for his severe Hemorrhagic Cystitis secondary to ESBL Kleb pneumo.   He will also require further Urologic intervention for his significant BPH with secondary Urinary retention once his infection is cleared.  Discussed in detail  again with patient at the bedside.

## 2022-08-21 LAB
ANION GAP SERPL CALC-SCNC: 4 MMOL/L — LOW (ref 5–17)
BUN SERPL-MCNC: 12 MG/DL — SIGNIFICANT CHANGE UP (ref 7–23)
CALCIUM SERPL-MCNC: 8.7 MG/DL — SIGNIFICANT CHANGE UP (ref 8.5–10.1)
CHLORIDE SERPL-SCNC: 108 MMOL/L — SIGNIFICANT CHANGE UP (ref 96–108)
CO2 SERPL-SCNC: 27 MMOL/L — SIGNIFICANT CHANGE UP (ref 22–31)
CREAT SERPL-MCNC: 0.86 MG/DL — SIGNIFICANT CHANGE UP (ref 0.5–1.3)
EGFR: 97 ML/MIN/1.73M2 — SIGNIFICANT CHANGE UP
GLUCOSE SERPL-MCNC: 86 MG/DL — SIGNIFICANT CHANGE UP (ref 70–99)
HCT VFR BLD CALC: 38.2 % — LOW (ref 39–50)
HGB BLD-MCNC: 11.9 G/DL — LOW (ref 13–17)
MCHC RBC-ENTMCNC: 25.9 PG — LOW (ref 27–34)
MCHC RBC-ENTMCNC: 31.2 G/DL — LOW (ref 32–36)
MCV RBC AUTO: 83.2 FL — SIGNIFICANT CHANGE UP (ref 80–100)
NRBC # BLD: 0 /100 WBCS — SIGNIFICANT CHANGE UP (ref 0–0)
PLATELET # BLD AUTO: 306 K/UL — SIGNIFICANT CHANGE UP (ref 150–400)
POTASSIUM SERPL-MCNC: 4.1 MMOL/L — SIGNIFICANT CHANGE UP (ref 3.5–5.3)
POTASSIUM SERPL-SCNC: 4.1 MMOL/L — SIGNIFICANT CHANGE UP (ref 3.5–5.3)
RBC # BLD: 4.59 M/UL — SIGNIFICANT CHANGE UP (ref 4.2–5.8)
RBC # FLD: 15 % — HIGH (ref 10.3–14.5)
SODIUM SERPL-SCNC: 139 MMOL/L — SIGNIFICANT CHANGE UP (ref 135–145)
WBC # BLD: 8.09 K/UL — SIGNIFICANT CHANGE UP (ref 3.8–10.5)
WBC # FLD AUTO: 8.09 K/UL — SIGNIFICANT CHANGE UP (ref 3.8–10.5)

## 2022-08-21 RX ADMIN — Medication 1 TABLET(S): at 17:06

## 2022-08-21 RX ADMIN — TAMSULOSIN HYDROCHLORIDE 0.4 MILLIGRAM(S): 0.4 CAPSULE ORAL at 21:32

## 2022-08-21 RX ADMIN — Medication 1 TABLET(S): at 05:44

## 2022-08-21 RX ADMIN — MEROPENEM 100 MILLIGRAM(S): 1 INJECTION INTRAVENOUS at 13:01

## 2022-08-21 RX ADMIN — FINASTERIDE 5 MILLIGRAM(S): 5 TABLET, FILM COATED ORAL at 11:03

## 2022-08-21 RX ADMIN — MEROPENEM 100 MILLIGRAM(S): 1 INJECTION INTRAVENOUS at 05:47

## 2022-08-21 RX ADMIN — MEROPENEM 100 MILLIGRAM(S): 1 INJECTION INTRAVENOUS at 21:32

## 2022-08-21 NOTE — DIETITIAN INITIAL EVALUATION ADULT - OTHER INFO
Pt lives c spouse; is independent c ADLs. Pt reports wt has been stable & appetite is good; no nutritional issues reported.  Denies any N/V/C/D or chew/swallowing difficulty. Pt admitted c urinary issues (UTI & urinary retention); remains acute on IV antibiotics c López.

## 2022-08-21 NOTE — PROGRESS NOTE ADULT - SUBJECTIVE AND OBJECTIVE BOX
INTERVAL HPI/OVERNIGHT EVENTS:        REVIEW OF SYSTEMS:  CONSTITUTIONAL:  no  complaints    NECK: No pain or stiffnes  RESPIRATORY: No SOB   CARDIOVASCULAR: No chest pain, palpitations, dizziness,   GASTROINTESTINAL: No abdominal pain. No nausea, vomiting,   NEUROLOGICAL: No headaches, no  blurry  vision no  dizziness  SKIN: No itching,   MUSCULOSKELETAL: No pain    MEDICATION:  finasteride 5 milliGRAM(s) Oral daily  lactobacillus acidophilus 1 Tablet(s) Oral two times a day  meropenem  IVPB      meropenem  IVPB 1000 milliGRAM(s) IV Intermittent every 8 hours  sodium chloride 0.9%. 1000 milliLiter(s) IV Continuous <Continuous>  tamsulosin 0.4 milliGRAM(s) Oral at bedtime    Vital Signs Last 24 Hrs  T(C): 36.7 (21 Aug 2022 05:05), Max: 37.1 (21 Aug 2022 00:00)  T(F): 98 (21 Aug 2022 05:05), Max: 98.8 (21 Aug 2022 00:00)  HR: 72 (21 Aug 2022 05:05) (61 - 76)  BP: 122/73 (21 Aug 2022 05:05) (101/64 - 122/73)  BP(mean): --  RR: 16 (21 Aug 2022 05:05) (16 - 18)  SpO2: 97% (21 Aug 2022 05:05) (95% - 97%)    Parameters below as of 21 Aug 2022 05:05  Patient On (Oxygen Delivery Method): room air        PHYSICAL EXAM:  GENERAL: NAD, well-groomed, well-developed  HEENT : Conjuntivae  clear sclerae anicteric  NECK: Supple, No JVD, Normal thyroid  NERVOUS SYSTEM:  Alert oriented   no  focal  deficits;   CHEST/LUNG: Clear    HEART: Regular rate and rhythm; No murmurs, rubs, or gallops  ABDOMEN: Soft, Nontender, Nondistended; Bowel sounds present  EXTREMITIES:  no  edema no  tenderness  SKIN: No rashes   LABS:                        11.9   8.09  )-----------( 306      ( 21 Aug 2022 07:18 )             38.2     08-21    139  |  108  |  12  ----------------------------<  86  4.1   |  27  |  0.86    Ca    8.7      21 Aug 2022 07:18          CAPILLARY BLOOD GLUCOSE          RADIOLOGY & ADDITIONAL TESTS:    Imaging reports  Personally Reviewed:  [ ] YES  [ ] NO    Consultant(s) Notes Reviewed:  [x ] YES  [ ] NO    Care Discussed with Consultants/Other Providers [x ] YES  [ ] NO  tProblem/Plan - 1:  ·  Problem: Urinary tract infection due to ESBL Klebsiella.   ·  Plan: ivf course of meropenem as  per  ID    Problem/Plan - 2:  ·  Problem: H/O urinary retention.   ·  Plan: foly  cathater  flomax  urology  consult. appreciated    Problem/Plan - 3:  ·  Problem: Urinary tract infection with hematuria.   ·  Plan: ivf  ab  monitor  h/h.

## 2022-08-21 NOTE — DIETITIAN INITIAL EVALUATION ADULT - PERTINENT LABORATORY DATA
08-21    139  |  108  |  12  ----------------------------<  86  4.1   |  27  |  0.86    Ca    8.7      21 Aug 2022 07:18

## 2022-08-21 NOTE — DIETITIAN INITIAL EVALUATION ADULT - OTHER CALCULATIONS
Ht (cm):  188  Wt (kg):   86.4 (8/20)  BMI:  24.4  IBW: 86.4 kg  %IBW:  100%  UBW:  stable  %UBW: 100%

## 2022-08-22 LAB
FLUAV AG NPH QL: SIGNIFICANT CHANGE UP
FLUBV AG NPH QL: SIGNIFICANT CHANGE UP
HCT VFR BLD CALC: 38 % — LOW (ref 39–50)
HGB BLD-MCNC: 12 G/DL — LOW (ref 13–17)
MCHC RBC-ENTMCNC: 26.2 PG — LOW (ref 27–34)
MCHC RBC-ENTMCNC: 31.6 G/DL — LOW (ref 32–36)
MCV RBC AUTO: 83 FL — SIGNIFICANT CHANGE UP (ref 80–100)
NRBC # BLD: 0 /100 WBCS — SIGNIFICANT CHANGE UP (ref 0–0)
PLATELET # BLD AUTO: 310 K/UL — SIGNIFICANT CHANGE UP (ref 150–400)
RBC # BLD: 4.58 M/UL — SIGNIFICANT CHANGE UP (ref 4.2–5.8)
RBC # FLD: 14.8 % — HIGH (ref 10.3–14.5)
SARS-COV-2 RNA SPEC QL NAA+PROBE: SIGNIFICANT CHANGE UP
WBC # BLD: 6.88 K/UL — SIGNIFICANT CHANGE UP (ref 3.8–10.5)
WBC # FLD AUTO: 6.88 K/UL — SIGNIFICANT CHANGE UP (ref 3.8–10.5)

## 2022-08-22 RX ADMIN — TAMSULOSIN HYDROCHLORIDE 0.4 MILLIGRAM(S): 0.4 CAPSULE ORAL at 21:11

## 2022-08-22 RX ADMIN — SODIUM CHLORIDE 75 MILLILITER(S): 9 INJECTION INTRAMUSCULAR; INTRAVENOUS; SUBCUTANEOUS at 21:11

## 2022-08-22 RX ADMIN — Medication 1 TABLET(S): at 05:14

## 2022-08-22 RX ADMIN — MEROPENEM 100 MILLIGRAM(S): 1 INJECTION INTRAVENOUS at 05:12

## 2022-08-22 RX ADMIN — FINASTERIDE 5 MILLIGRAM(S): 5 TABLET, FILM COATED ORAL at 11:13

## 2022-08-22 RX ADMIN — Medication 1 TABLET(S): at 17:03

## 2022-08-22 RX ADMIN — MEROPENEM 100 MILLIGRAM(S): 1 INJECTION INTRAVENOUS at 21:11

## 2022-08-22 RX ADMIN — SODIUM CHLORIDE 75 MILLILITER(S): 9 INJECTION INTRAMUSCULAR; INTRAVENOUS; SUBCUTANEOUS at 07:45

## 2022-08-22 RX ADMIN — MEROPENEM 100 MILLIGRAM(S): 1 INJECTION INTRAVENOUS at 15:00

## 2022-08-22 NOTE — PROGRESS NOTE ADULT - SUBJECTIVE AND OBJECTIVE BOX
INTERVAL HPI/OVERNIGHT EVENTS:    urine  in  almodovar  clear    REVIEW OF SYSTEMS:  CONSTITUTIONAL:feels  well   no  complaints    NECK: No pain or stiffnes  RESPIRATORY: No SOB   CARDIOVASCULAR: No chest pain, palpitations, dizziness,   GASTROINTESTINAL: No abdominal pain. No nausea, vomiting,   NEUROLOGICAL: No headaches, no  blurry  vision no  dizziness  SKIN: No itching,   MUSCULOSKELETAL: No pain    MEDICATION:  finasteride 5 milliGRAM(s) Oral daily  lactobacillus acidophilus 1 Tablet(s) Oral two times a day  meropenem  IVPB      meropenem  IVPB 1000 milliGRAM(s) IV Intermittent every 8 hours  sodium chloride 0.9%. 1000 milliLiter(s) IV Continuous <Continuous>  tamsulosin 0.4 milliGRAM(s) Oral at bedtime    Vital Signs Last 24 Hrs  T(C): 36.7 (22 Aug 2022 05:14), Max: 36.8 (21 Aug 2022 11:18)  T(F): 98.1 (22 Aug 2022 05:14), Max: 98.2 (21 Aug 2022 11:18)  HR: 60 (22 Aug 2022 05:14) (58 - 79)  BP: 115/79 (22 Aug 2022 05:14) (114/70 - 122/74)  BP(mean): --  RR: 17 (22 Aug 2022 05:14) (16 - 17)  SpO2: 98% (22 Aug 2022 05:14) (95% - 98%)    Parameters below as of 21 Aug 2022 16:06  Patient On (Oxygen Delivery Method): room air        PHYSICAL EXAM:  GENERAL: NAD, well-groomed, well-developed  HEENT : Conjuntivae  clear sclerae anicteric  NECK: Supple, No JVD, Normal thyroid  NERVOUS SYSTEM:  Alert oriented   no  focal  deficits;   CHEST/LUNG: Clear    HEART: Regular rate and rhythm; No murmurs, rubs, or gallops  ABDOMEN: Soft, Nontender, Nondistended; Bowel sounds present  EXTREMITIES:  no  edema no  tenderness  SKIN: No rashes   LABS:                        12.0   6.88  )-----------( 310      ( 22 Aug 2022 06:16 )             38.0     08-21    139  |  108  |  12  ----------------------------<  86  4.1   |  27  |  0.86    Ca    8.7      21 Aug 2022 07:18          CAPILLARY BLOOD GLUCOSE          RADIOLOGY & ADDITIONAL TESTS:    Imaging reports  Personally Reviewed:  [ ] YES  [ ] NO    Consultant(s) Notes Reviewed:  [ ] YES  [ ] NO    Care Discussed with Consultants/Other Providers [ x] YES  [ ] NO  tProblem/Plan - 1:  ·  Problem: Urinary tract infection due to ESBL Klebsiella.   ·  Plan: ivf meropenem    discharge  if  ok  with  ID    Problem/Plan - 2:  ·  Problem: H/O urinary retention.   ·  Plan: foly  cathater  flomax  urology  consult. appreciated    Problem/Plan - 3:  ·  Problem: Urinary tract infection with hematuria.   ·  Plan: ivf  ab  monitor  h/h.

## 2022-08-22 NOTE — PROGRESS NOTE ADULT - SUBJECTIVE AND OBJECTIVE BOX
TMAX - 98.7    On day # 6 Meropenem / s/p Zosyn x 2 days    Vital Signs Last 24 Hrs  T(C): 36.6 (22 Aug 2022 12:38), Max: 36.7 (21 Aug 2022 23:15)  T(F): 97.8 (22 Aug 2022 12:38), Max: 98.1 (21 Aug 2022 23:15)  HR: 61 (22 Aug 2022 12:38) (58 - 63)  BP: 120/76 (22 Aug 2022 12:38) (114/70 - 120/76)  RR: 18 (22 Aug 2022 12:38) (16 - 18)  SpO2: 95% (22 Aug 2022 12:38) (95% - 98%)  Parameters below as of 21 Aug 2022 16:06  Patient On (Oxygen Delivery Method): room air  Supplemental O2:  on RA    Awake, alert, no c/o abdominal pain or back/ flank pain and no N/V/D reported.  O2 Sat's remain between 95 -98% on RA.  Appetite is good.    PHYSICAL EXAM   General:  63 y/o black male awake, alert, sitting upright in bed now, pleasant and cooperative, in NAD  HEENT: conj pink, sclerae anicteric, PERRLA, no oral lesions noted  Neck: supple, no nodes noted  Heart: RR  Lungs: clear bilaterally  Abdomen: BS+, soft, nontender to palpation, no masses or HS-megaly detected  Back: no CVA or Spinal tenderness noted  Extremities: no edema LE's  Skin: warm, dry, no rash noted  López in place and draining  light yellow colored  urine now - 3,300 cc output recorded over the prior 24hrs.     I&O's Summary :    21 Aug 2022 07:01  -  22 Aug 2022 07:00  --------------------------------------------------------  IN: 900 mL / OUT: 3300 mL / NET: -2400 mL    22 Aug 2022 07:01  -  22 Aug 2022 15:59  --------------------------------------------------------  IN: 0 mL / OUT: 1450 mL / NET: -1450 mL      LABS:  CBC Full  -  ( 22 Aug 2022 06:16 )  WBC Count : 6.88 K/uL  RBC Count : 4.58 M/uL  Hemoglobin : 12.0 g/dL  Hematocrit : 38.0 %  Platelet Count - Automated : 310 K/uL  Mean Cell Volume : 83.0 fl  Mean Cell Hemoglobin : 26.2 pg  Mean Cell Hemoglobin Concentration : 31.6 g/dL  Auto Neutrophil # : x  Auto Lymphocyte # : x  Auto Monocyte # : x  Auto Eosinophil # : x  Auto Basophil # : x  Auto Neutrophil % : x  Auto Lymphocyte % : x  Auto Monocyte % : x  Auto Eosinophil % : x  Auto Basophil % : x    08-21    139  |  108  |  12  ----------------------------<  86  4.1   |  27  |  0.86    Ca    8.7      21 Aug 2022 07:18    Sedimentation Rate, Erythrocyte: 4 mm/hr (08-16 @ 07:06)    C-Reactive Protein, Serum (08.16.22 @ 07:06)    C-Reactive Protein, Serum: <3: Test Repeated mg/L    Procalcitonin, Serum (08.16.22 @ 07:06)    Procalcitonin, Serum: 0.03:     Hepatitis C Antibody Test (08.15.22 @ 07:07)    Hepatitis C Virus S/CO Ratio: 0.09 S/CO    Hepatitis C Virus Interpretation: Nonreact: Hepatitis C AB    Lactate, Blood (08.14.22 @ 15:52)    Lactate, Blood: 1.2 mmol/L      MICROBIOLOGY:  Flu With COVID-19 By ELADIO (08.22.22 @ 05:00)    SARS-CoV-2 Result: NotDetec: EUA/IVD    Influenza A Result: NotDetec: EUA/IVD    Influenza B Result: NotDetec: EUA/IVD    Specimen Source: Catheterized Catheterized (08-18 @ 16:10)  Culture Results:   No growth (08-18 @ 16:10)      Specimen Source: .Blood Blood-Peripheral (08-14 @ 16:55)  Culture Results:   No Growth Final (08-14 @ 16:55)    Specimen Source: .Urine (08-14 @ 16:55)  Culture Results:   50,000 - 99,000 CFU/mL Klebsiella pneumoniae ESBL (08-14 @ 16:55)    -  Trimethoprim/Sulfamethoxazole: R >2/38    -  Tigecycline: S <=2    -  Tobramycin: R >8    -  Levofloxacin: R >4    -  Meropenem: S <=1    -  Nitrofurantoin: R >64 Should not be used to treat pyelonephritis    -  Piperacillin/Tazobactam: I 32    -  Aztreonam: R >16    -  Cefazolin: R >16 (MIC_CL_COM_ENTERIC_CEFAZU) For uncomplicated UTI with K. pneumoniae, E. coli, or P. mirablis: CEE <=16 is sensitive and CEE >=32 is resistant. This also predicts results for oral agents cefaclor, cefdinir, cefpodoxime, cefprozil, cefuroxime axetil, cephalexin and locarbef for uncomplicated UTI. Note that some isolates may be susceptible to these agents while testing resistant to cefazolin.    -  Cefepime: R >16    -  Ceftriaxone: R >32 Enterobacter, Klebsiella aerogenes, Citrobacter, and Serratia may develop resistance during prolonged therapy    -  Ciprofloxacin: R >2    -  Ertapenem: S <=0.5    -  Gentamicin: I 8    -  Imipenem: S <=1    -  Amikacin: S <=16    -  Amoxicillin/Clavulanic Acid: R >16/8    -  Ampicillin: R >16 These ampicillin results predict results for amoxicillin    -  Ampicillin/Sulbactam: R >16/8 Enterobacter, Klebsiella aerogenes, Citrobacter, and Serratia may develop resistance during prolonged therapy (3-4 days)    Organism: Klebsiella pneumoniae ESBL    Method Type: CEE    Specimen Source: .Blood Blood-Peripheral (08-14 @ 16:15)  Culture Results:   No Growth Final (08-14 @ 16:15)      Flu With COVID-19 By ELADIO (08.14.22 @ 15:52)    SARS-CoV-2 Result: NotDetec: EUA/IVD    Influenza A Result: NotDetec: EUA/IVD    Influenza B Result: NotDetec: EUA/IVD    Specimen Source: Catheterized Catheterized (08-09 @ 10:00)  Culture Results:   >100,000 CFU/ml Klebsiella pneumoniae ESBL (08-09 @ 10:00)  Culture - Urine (08.09.22 @ 10:00)    -  Amikacin: S <=16    -  Amoxicillin/Clavulanic Acid: I 16/8    -  Ampicillin: R >16 These ampicillin results predict results for amoxicillin    -  Ampicillin/Sulbactam: R >16/8 Enterobacter, Klebsiella aerogenes, Citrobacter, and Serratia may develop resistance during prolonged therapy (3-4 days)    -  Aztreonam: R >16    -  Cefazolin: R >16 (MIC_CL_COM_ENTERIC_CEFAZU) For uncomplicated UTI with K. pneumoniae, E. coli, or P. mirablis: CEE <=16 is sensitive and CEE >=32 is resistant. This also predicts results for oral agents cefaclor, cefdinir, cefpodoxime, cefprozil, cefuroxime axetil, cephalexin and locarbef for uncomplicated UTI. Note that some isolates may be susceptible to these agents while testing resistant to cefazolin.    -  Cefepime: R >16    -  Ceftriaxone: R >32 Enterobacter, Klebsiella aerogenes, Citrobacter, and Serratia may develop resistance during prolonged therapy    -  Ciprofloxacin: R >2    -  Ertapenem: S <=0.5    -  Gentamicin: I 8    -  Imipenem: S <=1    -  Levofloxacin: R >4    -  Meropenem: S <=1    -  Nitrofurantoin: R >64 Should not be used to treat pyelonephritis    -  Piperacillin/Tazobactam: S 16    -  Tigecycline: S <=2    -  Tobramycin: R >8    -  Trimethoprim/Sulfamethoxazole: R >2/38    Organism Identification: Klebsiella pneumoniae ESBL    Organism: Klebsiella pneumoniae ESBL    Method Type: CEE    HIV-1/2 Antigen/Antibody Screen by CMIA (08.15.22 @ 07:07)    HIV-1/2 Combo Result: Nonreact:    RADIOLOGY:  < from: US Renal (08.16.22 @ 09:29) >  ACC: [id number] EXAM:  US KIDNEY(S)                        PROCEDURE DATE:  08/16/2022    INTERPRETATION:  CLINICAL INFORMATION: BPH, hematuria, retention  COMPARISON: None available.  TECHNIQUE: Sonography of the kidneys and bladder.  FINDINGS:  Right kidney: 10.6 cm. No renal mass, hydronephrosis or calculi.  Left kidney: 11.7 cm. No renal mass, hydronephrosis or calculi.  Urinary bladder: Decompressed with López  Exceedingly large prostate gland (8.8 x 5.6 x 6.7 cm) for a volume of 1   74 cc  IMPRESSION:  Unremarkable kidneys. No hydronephrosis.  Exceedingly large prostate gland.      Impression:  63 y/o black  male with hx of BPH, s/p Rezum procedure in 2017 and did well until he had an episode of Urinary Retention in 2020 requiring López placement and then removal with a UTI at the time, with a recurrent episode of Urinary Retention requiring López placement on 8/2/22 at [facility]  which was subsequently removed in his Urologist's Office on 8/8/22, but patient again developed Urinary retention and came to the [facility] ER on 8/9/22 where a López was replaced and patient was rx'ed with Cefdinir empirically for a presumed UTI, now admitted via the ER to [facility] on 8/14/22 after being called by the ER to return due to Urine Cx results showing growth of an ESBL Kleb pneumoniae in the Urine Cx.  Patient reports having an elevated PSA and previously underwent w/u with an MRI which was reported as normal.  In the ER patient was found to be afebrile and WBC's were WNL and he was noted to have visible hematuria.  Lactate was WNL and 2 Blood Cx's and a new Urine Cx were obtained and he was begun on rx with Zosyn.  Now 2 out of 2 Blood Cx's from 8/14/22 are negative thus far and repeat Urine Cx from 8/14/22 is now reported with 50,000 - 99,000 cfu of ESBL Kleb pneumo with sensitivities pending.  Patient remains afebrile and Renal Sono done this AM is WNL with no evidence of hydro, mass, or calculi but reported with an exceedingly large prostate. ESR, CRP, and Procalcitonin are WNL. Noted now with marked clearing of the initial gross hematuria.  Repeat Urine Cx from 8/14/22 now finalized with growth of ESBL Kleb pneumo which is now reported as only Intermediately sensitive to the Zosyn.  Repeat U/A  noted with 25-50 RBC's,  6-10WBC's, and LE Moderate with repeat Urine Cx from 8/18/22  now reported as Negative.    Suggestions:  Will therefore continue present ab rx with Meropenem and plan to complete 7 days of rx  ( today is day # 6 ) for severe Hemorrhagic Cystitis secondary to the ESBL Kleb pneumo.  Continue Bacid rx while on ab's.  Follow-up temps and labs closely.  He will also require further Urologic intervention for his significant BPH with secondary Urinary retention once his infection is cleared.  Discussed in detail  again with patient at the bedside.

## 2022-08-23 ENCOUNTER — TRANSCRIPTION ENCOUNTER (OUTPATIENT)
Age: 62
End: 2022-08-23

## 2022-08-23 RX ORDER — MEROPENEM 1 G/30ML
1000 INJECTION INTRAVENOUS ONCE
Refills: 0 | Status: COMPLETED | OUTPATIENT
Start: 2022-08-23 | End: 2022-08-23

## 2022-08-23 RX ORDER — TAMSULOSIN HYDROCHLORIDE 0.4 MG/1
1 CAPSULE ORAL
Qty: 30 | Refills: 0
Start: 2022-08-23 | End: 2022-09-21

## 2022-08-23 RX ORDER — FINASTERIDE 5 MG/1
1 TABLET, FILM COATED ORAL
Qty: 30 | Refills: 0
Start: 2022-08-23 | End: 2022-09-21

## 2022-08-23 RX ADMIN — MEROPENEM 100 MILLIGRAM(S): 1 INJECTION INTRAVENOUS at 13:20

## 2022-08-23 RX ADMIN — TAMSULOSIN HYDROCHLORIDE 0.4 MILLIGRAM(S): 0.4 CAPSULE ORAL at 21:16

## 2022-08-23 RX ADMIN — Medication 1 TABLET(S): at 05:28

## 2022-08-23 RX ADMIN — FINASTERIDE 5 MILLIGRAM(S): 5 TABLET, FILM COATED ORAL at 11:07

## 2022-08-23 RX ADMIN — MEROPENEM 100 MILLIGRAM(S): 1 INJECTION INTRAVENOUS at 21:16

## 2022-08-23 RX ADMIN — SODIUM CHLORIDE 75 MILLILITER(S): 9 INJECTION INTRAMUSCULAR; INTRAVENOUS; SUBCUTANEOUS at 13:20

## 2022-08-23 RX ADMIN — MEROPENEM 100 MILLIGRAM(S): 1 INJECTION INTRAVENOUS at 05:28

## 2022-08-23 RX ADMIN — Medication 1 TABLET(S): at 18:10

## 2022-08-23 NOTE — DISCHARGE NOTE PROVIDER - HOSPITAL COURSE
62M w/ PMH BPH, indwelling López since 8/2 called back to ED d/t (+) UC which showed resistance to prescribed PO abx. Pt reports López placed 8/2, d/c'd on 8/8 w/in Uro office, pt w/ urinary retention s/p removal, presented to ED on 8/9 w/ López placement. Pt returned to ED next day d/t leaking catheter. UC from 8/9-10 + ESBL Klebsiella, resistant to PO abx. Pt endorses + mild chill, intermittent gross hematuria and slight penile discomfort 2/2 López. Denies fever, h/a, dizziness, CP, SOB, cough, abd pain, flank pain, LE pain / swelling, rash. Pt w/ hx UTIs in past.   no  abd  pain  no  fever  no  chills  1: ·  Problem: Urinary tract infection due to ESBL Klebsiella.   ·  Plan: ivf meropenem    discharge  if  ok  with  ID  2:·  Problem: H/O urinary retention.   ·  Plan: foly  cathater  flomax  urology  consult. appreciated  3: ·  Problem: Urinary tract infection with hematuria.   ·  Plan: ivf  ab  monitor  h/h.    SEEN BY ID:  Impression:  61 y/o black  male with hx of BPH, s/p Rezum procedure in 2017 and did well until he had an episode of Urinary Retention in 2020 requiring López placement and then removal with a UTI at the time, with a recurrent episode of Urinary Retention requiring López placement on 8/2/22 at [facility]  which was subsequently removed in his Urologist's Office on 8/8/22, but patient again developed Urinary retention and came to the [facility] ER on 8/9/22 where a López was replaced and patient was rx'ed with Cefdinir empirically for a presumed UTI, now admitted via the ER to [facility] on 8/14/22 after being called by the ER to return due to Urine Cx results showing growth of an ESBL Kleb pneumoniae in the Urine Cx.  Patient reports having an elevated PSA and previously underwent w/u with an MRI which was reported as normal.  In the ER patient was found to be afebrile and WBC's were WNL and he was noted to have visible hematuria.  Lactate was WNL and 2 Blood Cx's and a new Urine Cx were obtained and he was begun on rx with Zosyn.  Now 2 out of 2 Blood Cx's from 8/14/22 are negative thus far and repeat Urine Cx from 8/14/22 is now reported with 50,000 - 99,000 cfu of ESBL Kleb pneumo with sensitivities pending.  Patient remains afebrile and Renal Sono done this AM is WNL with no evidence of hydro, mass, or calculi but reported with an exceedingly large prostate. ESR, CRP, and Procalcitonin are WNL. Noted now with marked clearing of the initial gross hematuria.  Repeat Urine Cx from 8/14/22 now finalized with growth of ESBL Kleb pneumo which is now reported as only Intermediately sensitive to the Zosyn.  Repeat U/A  noted with 25-50 RBC's,  6-10WBC's, and LE Moderate with repeat Urine Cx from 8/18/22  now reported as Negative.  Suggestions:  Will therefore continue present ab rx with Meropenem and plan to complete 7 days of rx  ( today is day # 6 ) for severe Hemorrhagic Cystitis secondary to the ESBL Kleb pneumo.  Continue Bacid rx while on ab's.  Follow-up temps and labs closely.  He will also require further Urologic intervention for his significant BPH with secondary Urinary retention once his infection is cleared.  Discussed in detail  again with patient at the bedside.  John Hutchinson)

## 2022-08-23 NOTE — DISCHARGE NOTE PROVIDER - PROVIDER TOKENS
PROVIDER:[TOKEN:[[id number]]],PROVIDER:[TOKEN:[[id number]]],PROVIDER:[TOKEN:[[id number]]]

## 2022-08-23 NOTE — PROGRESS NOTE ADULT - SUBJECTIVE AND OBJECTIVE BOX
TMAX - 98.1    On day #  7 Meropenem    Vital Signs Last 24 Hrs  T(C): 36.3 (23 Aug 2022 12:22), Max: 36.7 (22 Aug 2022 19:06)  T(F): 97.3 (23 Aug 2022 12:22), Max: 98.1 (22 Aug 2022 23:38)  HR: 70 (23 Aug 2022 12:22) (50 - 70)  BP: 114/73 (23 Aug 2022 12:22) (102/59 - 116/69)  RR: 18 (23 Aug 2022 12:22) (16 - 18)  SpO2: 96% (23 Aug 2022 12:22) (96% - 98%)  Parameters below as of 23 Aug 2022 05:32  Patient On (Oxygen Delivery Method): room air  Supplemental O2:  on RA    Awake, alert, no c/o abdominal or back/ flank pain.  Feeling well today.  No fevers or chills reported.      PHYSICAL EXAM   General:  61 y/o black male awake, alert, sitting upright in bed now, pleasant and cooperative, in NAD  HEENT: conj pink, sclerae anicteric, PERRLA, no oral lesions noted  Neck: supple, no nodes noted  Heart: RR  Lungs: clear bilaterally  Abdomen: BS+, soft, nontender to palpation, no masses or HS-megaly detected  Back: no CVA or Spinal tenderness noted  Extremities: no edema LE's  Skin: warm, dry, no rash noted  López in place and draining  light yellow colored  urine now - 4,000 cc output recorded over the prior 24hrs.       I&O's Summary :    22 Aug 2022 07:01  -  23 Aug 2022 07:00  --------------------------------------------------------  IN: 1120 mL / OUT: 4000 mL / NET: -2880 mL    23 Aug 2022 07:01  -  23 Aug 2022 17:04  --------------------------------------------------------  IN: 0 mL / OUT: 900 mL / NET: -900 mL      LABS:  CBC Full  -  ( 22 Aug 2022 06:16 )  WBC Count : 6.88 K/uL  RBC Count : 4.58 M/uL  Hemoglobin : 12.0 g/dL  Hematocrit : 38.0 %  Platelet Count - Automated : 310 K/uL  Mean Cell Volume : 83.0 fl  Mean Cell Hemoglobin : 26.2 pg  Mean Cell Hemoglobin Concentration : 31.6 g/dL  Auto Neutrophil # : x  Auto Lymphocyte # : x  Auto Monocyte # : x  Auto Eosinophil # : x  Auto Basophil # : x  Auto Neutrophil % : x  Auto Lymphocyte % : x  Auto Monocyte % : x  Auto Eosinophil % : x  Auto Basophil % : x    Sedimentation Rate, Erythrocyte: 4 mm/hr (08-16 @ 07:06)    C-Reactive Protein, Serum (08.16.22 @ 07:06)    C-Reactive Protein, Serum: <3: Test Repeated mg/L    Procalcitonin, Serum (08.16.22 @ 07:06)    Procalcitonin, Serum: 0.03:     Hepatitis C Antibody Test (08.15.22 @ 07:07)    Hepatitis C Virus S/CO Ratio: 0.09 S/CO    Hepatitis C Virus Interpretation: Nonreact: Hepatitis C AB    Lactate, Blood (08.14.22 @ 15:52)    Lactate, Blood: 1.2 mmol/L      MICROBIOLOGY:    Flu With COVID-19 By ELADIO (08.22.22 @ 05:00)    SARS-CoV-2 Result: NotDetec: EUA/IVD    Influenza A Result: NotDetec: EUA/IVD    Influenza B Result: NotDetec: EUA/IVD    Specimen Source: Catheterized Catheterized (08-18 @ 16:10)  Culture Results:   No growth (08-18 @ 16:10)      Specimen Source: .Blood Blood-Peripheral (08-14 @ 16:55)  Culture Results:   No Growth Final (08-14 @ 16:55)    Specimen Source: .Urine (08-14 @ 16:55)  Culture Results:   50,000 - 99,000 CFU/mL Klebsiella pneumoniae ESBL (08-14 @ 16:55)    -  Trimethoprim/Sulfamethoxazole: R >2/38    -  Tigecycline: S <=2    -  Tobramycin: R >8    -  Levofloxacin: R >4    -  Meropenem: S <=1    -  Nitrofurantoin: R >64 Should not be used to treat pyelonephritis    -  Piperacillin/Tazobactam: I 32    -  Aztreonam: R >16    -  Cefazolin: R >16 (MIC_CL_COM_ENTERIC_CEFAZU) For uncomplicated UTI with K. pneumoniae, E. coli, or P. mirablis: CEE <=16 is sensitive and CEE >=32 is resistant. This also predicts results for oral agents cefaclor, cefdinir, cefpodoxime, cefprozil, cefuroxime axetil, cephalexin and locarbef for uncomplicated UTI. Note that some isolates may be susceptible to these agents while testing resistant to cefazolin.    -  Cefepime: R >16    -  Ceftriaxone: R >32 Enterobacter, Klebsiella aerogenes, Citrobacter, and Serratia may develop resistance during prolonged therapy    -  Ciprofloxacin: R >2    -  Ertapenem: S <=0.5    -  Gentamicin: I 8    -  Imipenem: S <=1    -  Amikacin: S <=16    -  Amoxicillin/Clavulanic Acid: R >16/8    -  Ampicillin: R >16 These ampicillin results predict results for amoxicillin    -  Ampicillin/Sulbactam: R >16/8 Enterobacter, Klebsiella aerogenes, Citrobacter, and Serratia may develop resistance during prolonged therapy (3-4 days)    Organism: Klebsiella pneumoniae ESBL    Method Type: CEE    Specimen Source: .Blood Blood-Peripheral (08-14 @ 16:15)  Culture Results:   No Growth Final (08-14 @ 16:15)      Flu With COVID-19 By ELADIO (08.14.22 @ 15:52)    SARS-CoV-2 Result: NotDetec: EUA/IVD    Influenza A Result: NotDetec: EUA/IVD    Influenza B Result: NotDetec: EUA/IVD    Specimen Source: Catheterized Catheterized (08-09 @ 10:00)  Culture Results:   >100,000 CFU/ml Klebsiella pneumoniae ESBL (08-09 @ 10:00)  Culture - Urine (08.09.22 @ 10:00)    -  Amikacin: S <=16    -  Amoxicillin/Clavulanic Acid: I 16/8    -  Ampicillin: R >16 These ampicillin results predict results for amoxicillin    -  Ampicillin/Sulbactam: R >16/8 Enterobacter, Klebsiella aerogenes, Citrobacter, and Serratia may develop resistance during prolonged therapy (3-4 days)    -  Aztreonam: R >16    -  Cefazolin: R >16 (MIC_CL_COM_ENTERIC_CEFAZU) For uncomplicated UTI with K. pneumoniae, E. coli, or P. mirablis: CEE <=16 is sensitive and CEE >=32 is resistant. This also predicts results for oral agents cefaclor, cefdinir, cefpodoxime, cefprozil, cefuroxime axetil, cephalexin and locarbef for uncomplicated UTI. Note that some isolates may be susceptible to these agents while testing resistant to cefazolin.    -  Cefepime: R >16    -  Ceftriaxone: R >32 Enterobacter, Klebsiella aerogenes, Citrobacter, and Serratia may develop resistance during prolonged therapy    -  Ciprofloxacin: R >2    -  Ertapenem: S <=0.5    -  Gentamicin: I 8    -  Imipenem: S <=1    -  Levofloxacin: R >4    -  Meropenem: S <=1    -  Nitrofurantoin: R >64 Should not be used to treat pyelonephritis    -  Piperacillin/Tazobactam: S 16    -  Tigecycline: S <=2    -  Tobramycin: R >8    -  Trimethoprim/Sulfamethoxazole: R >2/38    Organism Identification: Klebsiella pneumoniae ESBL    Organism: Klebsiella pneumoniae ESBL    Method Type: CEE    HIV-1/2 Antigen/Antibody Screen by CMIA (08.15.22 @ 07:07)    HIV-1/2 Combo Result: Nonreact:    RADIOLOGY:  < from:  Renal (08.16.22 @ 09:29) >  ACC: [id number] EXAM:  US KIDNEY(S)                        PROCEDURE DATE:  08/16/2022    INTERPRETATION:  CLINICAL INFORMATION: BPH, hematuria, retention  COMPARISON: None available.  TECHNIQUE: Sonography of the kidneys and bladder.  FINDINGS:  Right kidney: 10.6 cm. No renal mass, hydronephrosis or calculi.  Left kidney: 11.7 cm. No renal mass, hydronephrosis or calculi.  Urinary bladder: Decompressed with López  Exceedingly large prostate gland (8.8 x 5.6 x 6.7 cm) for a volume of 1   74 cc  IMPRESSION:  Unremarkable kidneys. No hydronephrosis.  Exceedingly large prostate gland.      Impression:   61 y/o black male with hx of BPH, s/p Rezum procedure in 2017 and did well until he had an episode of Urinary Retention in 2020 requiring López placement and then removal with a UTI at the time, with a recurrent episode of Urinary Retention requiring López placement on 8/2/22 at [facility]  which was subsequently removed in his Urologist's Office on 8/8/22, but patient again developed Urinary retention and came to the [facility] ER on 8/9/22 where a López was replaced and patient was rx'ed with Cefdinir empirically for a presumed UTI, now admitted via the ER to [facility] on 8/14/22 after being called by the ER to return due to Urine Cx results showing growth of an ESBL Kleb pneumoniae in the Urine Cx.  Patient reports having an elevated PSA and previously underwent w/u with an MRI which was reported as normal.  In the ER patient was found to be afebrile and WBC's were WNL and he was noted to have visible hematuria.  Lactate was WNL and 2 Blood Cx's and a new Urine Cx were obtained and he was begun on rx with Zosyn.  Now 2 out of 2 Blood Cx's from 8/14/22 are negative thus far and repeat Urine Cx from 8/14/22 is now reported with 50,000 - 99,000 cfu of ESBL Kleb pneumo with sensitivities pending.  Patient remains afebrile and Renal Sono done this AM is WNL with no evidence of hydro, mass, or calculi but reported with an exceedingly large prostate. ESR, CRP, and Procalcitonin are WNL. Noted now with marked clearing of the initial gross hematuria.  Repeat Urine Cx from 8/14/22 now finalized with growth of ESBL Kleb pneumo which is now reported as only Intermediately sensitive to the Zosyn.  Repeat U/A  noted with 25-50 RBC's,  6-10WBC's, and LE Moderate with repeat Urine Cx from 8/18/22  now reported as Negative.    Suggestions:   Will therefore complete 7 days of ab rx with Meropenem  tonight  for severe Hemorrhagic Cystitis secondary to the ESBL Kleb pneumo.  Continue Bacid rx while on ab's.  He will also require further Urologic intervention for his significant BPH with secondary Urinary retention - patient to follow-up with his Urologist at [facility] - reports that he has an appointment for 9/13/22 and will remain with the indwelling López until that time.  No objection from ID standpoint then to d/c to home in the AM if patient is otherwise medically stable.  Discussed in detail  again with patient at the bedside.

## 2022-08-23 NOTE — DISCHARGE NOTE PROVIDER - CARE PROVIDER_API CALL
Ezequiel Cruz)  Physician  NPs  [address] [place] [address]  Phone: [phone]  Fax: [phone]  Follow Up Time:     Enid Payan)  Urology  [address] [place] [address]  Phone: [phone]  Fax: [phone]  Follow Up Time:     John Hutchinson)  Internal Medicine  [address] [place] [address]  Phone: [phone]  Fax: [phone]  Follow Up Time:

## 2022-08-23 NOTE — DISCHARGE NOTE PROVIDER - CARE PROVIDERS DIRECT ADDRESSES
[email or web address],DirectAddress_Unknown

## 2022-08-23 NOTE — DISCHARGE NOTE PROVIDER - NSDCMRMEDTOKEN_GEN_ALL_CORE_FT
finasteride 5 mg oral tablet: 1 tab(s) orally once a day  tamsulosin 0.4 mg oral capsule: 1 cap(s) orally once a day (at bedtime)   finasteride 5 mg oral tablet: 1 tab(s) orally once a day MDD:1  tamsulosin 0.4 mg oral capsule: 1 cap(s) orally once a day (at bedtime) MDD:1

## 2022-08-23 NOTE — PROGRESS NOTE ADULT - SUBJECTIVE AND OBJECTIVE BOX
INTERVAL HPI/OVERNIGHT EVENTS:    continues  with  clear  urine  in  almodovar    REVIEW OF SYSTEMS:  CONSTITUTIONAL:  no  complaints    NECK: No pain or stiffnes  RESPIRATORY: No SOB   CARDIOVASCULAR: No chest pain, palpitations, dizziness,   GASTROINTESTINAL: No abdominal pain. No nausea, vomiting,   NEUROLOGICAL: No headaches, no  blurry  vision no  dizziness  SKIN: No itching,   MUSCULOSKELETAL: No pain    MEDICATION:  finasteride 5 milliGRAM(s) Oral daily  lactobacillus acidophilus 1 Tablet(s) Oral two times a day  meropenem  IVPB      meropenem  IVPB 1000 milliGRAM(s) IV Intermittent every 8 hours  sodium chloride 0.9%. 1000 milliLiter(s) IV Continuous <Continuous>  tamsulosin 0.4 milliGRAM(s) Oral at bedtime    Vital Signs Last 24 Hrs  T(C): 36.6 (23 Aug 2022 05:32), Max: 36.7 (22 Aug 2022 19:06)  T(F): 97.9 (23 Aug 2022 05:32), Max: 98.1 (22 Aug 2022 23:38)  HR: 50 (23 Aug 2022 05:32) (50 - 66)  BP: 116/69 (23 Aug 2022 05:32) (102/59 - 120/76)  BP(mean): --  RR: 16 (23 Aug 2022 05:32) (16 - 18)  SpO2: 97% (23 Aug 2022 05:32) (95% - 98%)    Parameters below as of 23 Aug 2022 05:32  Patient On (Oxygen Delivery Method): room air        PHYSICAL EXAM:  GENERAL: NAD, well-groomed, well-developed  HEENT : Conjuntivae  clear sclerae anicteric  NECK: Supple, No JVD, Normal thyroid  NERVOUS SYSTEM:  Alert oriented   no  focal  deficits;   CHEST/LUNG: Clear    HEART: Regular rate and rhythm; No murmurs, rubs, or gallops  ABDOMEN: Soft, Nontender, Nondistended; Bowel sounds present  EXTREMITIES:  no  edema no  tenderness  SKIN: No rashes   LABS:                        12.0   6.88  )-----------( 310      ( 22 Aug 2022 06:16 )             38.0               CAPILLARY BLOOD GLUCOSE          RADIOLOGY & ADDITIONAL TESTS:    Imaging reports  Personally Reviewed:  [ ] YES  [ ] NO    Consultant(s) Notes Reviewed:  [x ] YES  [ ] NO    Care Discussed with Consultants/Other Providers [x ] YES  [ ] NO  tProblem/Plan - 1:  ·  Problem: Urinary tract infection due to ESBL Klebsiella.   ·  Plan: ivf meropenem    discharge  if  ok  with  ID    Problem/Plan - 2:  ·  Problem: H/O urinary retention.   ·  Plan: foly  cathater  flomax  urology  consult. appreciated    Problem/Plan - 3:  ·  Problem: Urinary tract infection with hematuria.   ·  Plan: ivf  ab  monitor  h/h.

## 2022-08-24 ENCOUNTER — TRANSCRIPTION ENCOUNTER (OUTPATIENT)
Age: 62
End: 2022-08-24

## 2022-08-24 VITALS
DIASTOLIC BLOOD PRESSURE: 74 MMHG | SYSTOLIC BLOOD PRESSURE: 134 MMHG | TEMPERATURE: 98 F | HEART RATE: 65 BPM | RESPIRATION RATE: 18 BRPM | OXYGEN SATURATION: 98 %

## 2022-08-24 LAB
ANION GAP SERPL CALC-SCNC: 3 MMOL/L — LOW (ref 5–17)
BUN SERPL-MCNC: 12 MG/DL — SIGNIFICANT CHANGE UP (ref 7–23)
CALCIUM SERPL-MCNC: 9.1 MG/DL — SIGNIFICANT CHANGE UP (ref 8.5–10.1)
CHLORIDE SERPL-SCNC: 107 MMOL/L — SIGNIFICANT CHANGE UP (ref 96–108)
CO2 SERPL-SCNC: 30 MMOL/L — SIGNIFICANT CHANGE UP (ref 22–31)
CREAT SERPL-MCNC: 0.88 MG/DL — SIGNIFICANT CHANGE UP (ref 0.5–1.3)
EGFR: 97 ML/MIN/1.73M2 — SIGNIFICANT CHANGE UP
GLUCOSE SERPL-MCNC: 89 MG/DL — SIGNIFICANT CHANGE UP (ref 70–99)
HCT VFR BLD CALC: 39.4 % — SIGNIFICANT CHANGE UP (ref 39–50)
HGB BLD-MCNC: 12.2 G/DL — LOW (ref 13–17)
MCHC RBC-ENTMCNC: 25.6 PG — LOW (ref 27–34)
MCHC RBC-ENTMCNC: 31 G/DL — LOW (ref 32–36)
MCV RBC AUTO: 82.8 FL — SIGNIFICANT CHANGE UP (ref 80–100)
NRBC # BLD: 0 /100 WBCS — SIGNIFICANT CHANGE UP (ref 0–0)
PLATELET # BLD AUTO: 343 K/UL — SIGNIFICANT CHANGE UP (ref 150–400)
POTASSIUM SERPL-MCNC: 4 MMOL/L — SIGNIFICANT CHANGE UP (ref 3.5–5.3)
POTASSIUM SERPL-SCNC: 4 MMOL/L — SIGNIFICANT CHANGE UP (ref 3.5–5.3)
RBC # BLD: 4.76 M/UL — SIGNIFICANT CHANGE UP (ref 4.2–5.8)
RBC # FLD: 14.7 % — HIGH (ref 10.3–14.5)
SODIUM SERPL-SCNC: 140 MMOL/L — SIGNIFICANT CHANGE UP (ref 135–145)
WBC # BLD: 6.47 K/UL — SIGNIFICANT CHANGE UP (ref 3.8–10.5)
WBC # FLD AUTO: 6.47 K/UL — SIGNIFICANT CHANGE UP (ref 3.8–10.5)

## 2022-08-24 RX ORDER — TAMSULOSIN HYDROCHLORIDE 0.4 MG/1
1 CAPSULE ORAL
Qty: 30 | Refills: 0
Start: 2022-08-24 | End: 2022-09-22

## 2022-08-24 RX ORDER — FINASTERIDE 5 MG/1
1 TABLET, FILM COATED ORAL
Qty: 30 | Refills: 0
Start: 2022-08-24 | End: 2022-09-22

## 2022-08-24 RX ADMIN — SODIUM CHLORIDE 75 MILLILITER(S): 9 INJECTION INTRAMUSCULAR; INTRAVENOUS; SUBCUTANEOUS at 05:18

## 2022-08-24 RX ADMIN — FINASTERIDE 5 MILLIGRAM(S): 5 TABLET, FILM COATED ORAL at 11:55

## 2022-08-24 RX ADMIN — Medication 1 TABLET(S): at 17:50

## 2022-08-24 RX ADMIN — Medication 1 TABLET(S): at 05:19

## 2022-08-24 NOTE — DISCHARGE NOTE NURSING/CASE MANAGEMENT/SOCIAL WORK - NSDCPEFALRISK_GEN_ALL_CORE
For information on Fall & Injury Prevention, visit: [email or web address] OR  [email or web address] OR  https://www.cdc.gov/steadi/patient.html

## 2022-08-24 NOTE — PROGRESS NOTE ADULT - REASON FOR ADMISSION
MDR UTI
MMDR UTI
MDR UTI
MMDR UTI
MDR UTI
MMDR UTI
MMDR UTI
no

## 2022-08-24 NOTE — DISCHARGE NOTE NURSING/CASE MANAGEMENT/SOCIAL WORK - PATIENT PORTAL LINK FT
You can access the FollowMyHealth Patient Portal offered by [facility] by registering at the following website: [email or web address]. By joining [employer]’s FollowMyHealth portal, you will also be able to view your health information using other applications (apps) compatible with our system.

## 2022-08-24 NOTE — PROGRESS NOTE ADULT - SUBJECTIVE AND OBJECTIVE BOX
INTERVAL HPI/OVERNIGHT EVENTS:        REVIEW OF SYSTEMS:  CONSTITUTIONAL: feels  well no  complaints    NECK: No pain or stiffnes  RESPIRATORY: No SOB   CARDIOVASCULAR: No chest pain, palpitations, dizziness,   GASTROINTESTINAL: No abdominal pain. No nausea, vomiting,   NEUROLOGICAL: No headaches, no  blurry  vision no  dizziness  SKIN: No itching,   MUSCULOSKELETAL: No pain    MEDICATION:  finasteride 5 milliGRAM(s) Oral daily  lactobacillus acidophilus 1 Tablet(s) Oral two times a day  sodium chloride 0.9%. 1000 milliLiter(s) IV Continuous <Continuous>  tamsulosin 0.4 milliGRAM(s) Oral at bedtime    Vital Signs Last 24 Hrs  T(C): 36.8 (24 Aug 2022 05:22), Max: 36.8 (23 Aug 2022 23:44)  T(F): 98.3 (24 Aug 2022 05:22), Max: 98.3 (24 Aug 2022 05:22)  HR: 51 (24 Aug 2022 05:22) (51 - 70)  BP: 125/78 (24 Aug 2022 05:22) (113/70 - 125/78)  BP(mean): --  RR: 16 (24 Aug 2022 05:22) (16 - 18)  SpO2: 98% (24 Aug 2022 05:22) (96% - 99%)    Parameters below as of 24 Aug 2022 05:22  Patient On (Oxygen Delivery Method): room air        PHYSICAL EXAM:  GENERAL: NAD, well-groomed, well-developed  HEENT : Conjuntivae  clear sclerae anicteric  NECK: Supple, No JVD, Normal thyroid  NERVOUS SYSTEM:  Alert oriented   no  focal  deficits;   CHEST/LUNG: Clear    HEART: Regular rate and rhythm; No murmurs, rubs, or gallops  ABDOMEN: Soft, Nontender, Nondistended; Bowel sounds present  EXTREMITIES:  no  edema no  tenderness  SKIN: No rashes   LABS:              CAPILLARY BLOOD GLUCOSE          RADIOLOGY & ADDITIONAL TESTS:    Imaging reports  Personally Reviewed:  [ ] YES  [ ] NO    Consultant(s) Notes Reviewed:  [ x] YES  [ ] NO    Care Discussed with Consultants/Other Providers [x ] YES  [ ] NO  tProblem/Plan - 1:  ·  Problem: Urinary tract infection due to ESBL Klebsiella.   ·  Plan: ivf meropenem    discharge  home  to  follow  with  his  private  urologiist    Problem/Plan - 2:  ·  Problem: H/O urinary retention.   ·  Plan: foly  cathater  flomax  urology  consult. appreciated    Problem/Plan - 3:  ·  Problem: Urinary tract infection with hematuria.   ·  Plan: ivf  ab  monitor  h/h.

## 2022-08-24 NOTE — PROGRESS NOTE ADULT - PROVIDER SPECIALTY LIST ADULT
Infectious Disease
Urology
Infectious Disease
Infectious Disease
Internal Medicine
Infectious Disease
Infectious Disease
Urology

## 2022-08-28 ENCOUNTER — FORM ENCOUNTER (OUTPATIENT)
Age: 62
End: 2022-08-28

## 2022-08-29 DIAGNOSIS — B96.1 KLEBSIELLA PNEUMONIAE [K. PNEUMONIAE] AS THE CAUSE OF DISEASES CLASSIFIED ELSEWHERE: ICD-10-CM
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
## 2022-08-29 DIAGNOSIS — T83.511A INFECTION AND INFLAMMATORY REACTION DUE TO INDWELLING URETHRAL CATHETER, INITIAL ENCOUNTER: ICD-10-CM

## 2022-08-29 DIAGNOSIS — R31.0 GROSS HEMATURIA: ICD-10-CM

## 2022-08-29 DIAGNOSIS — N40.0 BENIGN PROSTATIC HYPERPLASIA WITHOUT LOWER URINARY TRACT SYMPTOMS: ICD-10-CM
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
## 2022-08-29 DIAGNOSIS — N30.91 CYSTITIS, UNSPECIFIED WITH HEMATURIA: ICD-10-CM

## 2022-09-08 ENCOUNTER — NON-APPOINTMENT (OUTPATIENT)
Age: 62
End: 2022-09-08

## 2022-09-08 ENCOUNTER — APPOINTMENT (OUTPATIENT)
Dept: CARE COORDINATION | Facility: HOME HEALTH | Age: 62
End: 2022-09-08

## 2022-09-08 DIAGNOSIS — Z78.9 OTHER SPECIFIED HEALTH STATUS: ICD-10-CM

## 2022-09-08 DIAGNOSIS — Z97.8 PRESENCE OF OTHER SPECIFIED DEVICES: ICD-10-CM

## 2022-09-08 DIAGNOSIS — R33.9 RETENTION OF URINE, UNSPECIFIED: ICD-10-CM

## 2022-09-08 DIAGNOSIS — N39.0 URINARY TRACT INFECTION, SITE NOT SPECIFIED: ICD-10-CM

## 2022-09-08 DIAGNOSIS — Z87.440 PERSONAL HISTORY OF URINARY (TRACT) INFECTIONS: ICD-10-CM

## 2022-09-08 DIAGNOSIS — Z87.898 PERSONAL HISTORY OF OTHER SPECIFIED CONDITIONS: ICD-10-CM

## 2022-09-08 DIAGNOSIS — Z87.438 PERSONAL HISTORY OF OTHER DISEASES OF MALE GENITAL ORGANS: ICD-10-CM

## 2022-09-08 PROBLEM — Z00.00 ENCOUNTER FOR PREVENTIVE HEALTH EXAMINATION: Status: ACTIVE | Noted: 2022-09-08

## 2022-09-08 PROCEDURE — 99347 HOME/RES VST EST SF MDM 20: CPT | Mod: 95

## 2022-09-08 RX ORDER — FINASTERIDE 5 MG/1
5 TABLET, FILM COATED ORAL
Refills: 0 | Status: ACTIVE | COMMUNITY

## 2022-09-08 RX ORDER — TAMSULOSIN HYDROCHLORIDE 0.4 MG/1
0.4 CAPSULE ORAL
Refills: 0 | Status: ACTIVE | COMMUNITY

## 2022-09-09 PROBLEM — Z97.8 FOLEY CATHETER IN PLACE: Status: ACTIVE | Noted: 2022-09-09

## 2022-09-09 PROBLEM — N39.0 UTI (URINARY TRACT INFECTION): Status: ACTIVE | Noted: 2022-09-08

## 2022-09-09 PROBLEM — R33.9 URINARY RETENTION: Status: ACTIVE | Noted: 2022-09-08

## 2022-09-09 NOTE — ASSESSMENT
[FreeTextEntry1] : "62 year old Male w/ PMH BPH, indwelling López since 8/2 called back to ED d/t (+) UC which showed resistance to prescribed PO abx. Pt reports López placed 8/2, d/c'd on 8/8 w/in Uro office, pt w/ urinary retention s/p removal, presented to ED on 8/9 w/ López placement. Pt returned to ED next day d/t leaking catheter. UC from 8/9-10 + ESBL Klebsiella, resistant to PO abx. Pt endorses + mild chill, intermittent gross hematuria and slight penile discomfort 2/2 López. Denies fever, h/a, dizziness, CP, SOB, cough, abd pain, flank pain, LE pain / swelling, rash. Pt w/ hx UTIs in past. " The patient was discharged home in stable condition with home care services and follow up care.\par \par BPH, UTI, Urinary Retention with López: stable\par Continue established treatment plan with follow up\par Continue Finasteride and Flomax\par Monitor for worsening  signs and symptoms of and reviewed when to call medical provider\par Pt verbalized good understanding\par Follow up with Medical providers: PCP, Urology\par \par \par Pt will need continued Home Care Services RN\par This patient is Enrolled in the [employer] Program through [employer]\par CN reviewed with the pt that he is under the [facility] [employer] program for home care until he is seen by his PCP.   CN will be assigned to sign Home Care orders post-discharge.\par \par

## 2022-09-09 NOTE — PHYSICAL EXAM
[de-identified] : Limited Visual Physical Exam during TeleHealth Visit [de-identified] : Awake and alert [de-identified] : Calm

## 2022-09-09 NOTE — HISTORY OF PRESENT ILLNESS
[FreeTextEntry1] : Follow up post discharge\par  [de-identified] : This patient is Enrolled in the [employer] Program through [employer]\par Copied As per [facility] Discharge Summary\par \par "62 year old Male w/ PMH BPH, indwelling López since 8/2 called back to ED d/t (+) UC which showed resistance to prescribed PO abx. Pt reports López placed 8/2, d/c'd on 8/8 w/in Uro office, pt w/ urinary retention s/p removal, presented to ED on 8/9 w/ López placement. Pt returned to ED next day d/t leaking catheter. UC from 8/9-10 + ESBL Klebsiella, resistant to PO abx. Pt endorses + mild chill, intermittent gross hematuria and slight penile discomfort 2/2 López. Denies fever, h/a, dizziness, CP, SOB, cough, abd pain, flank pain, LE pain / swelling, rash. Pt w/ hx UTIs in past. " The patient was discharged home in stable condition with home care services and follow up care.\par \par During the TeleHealth the patient was in no acute distress.  Able to speak with complete sentences and no audible wheeze noted.\par The patient denies chest pain, shortness of breath, cough, hemoptysis, fever, palpitations, syncope\par \par CN reviewed with the pt that he is under the [facility] program for home care until he is seen by his PCP.   CN will be assigned to sign Home Care orders post-discharge.\par \par \par \par